# Patient Record
Sex: FEMALE | Race: WHITE | Employment: UNEMPLOYED | URBAN - NONMETROPOLITAN AREA
[De-identification: names, ages, dates, MRNs, and addresses within clinical notes are randomized per-mention and may not be internally consistent; named-entity substitution may affect disease eponyms.]

---

## 2017-10-10 ENCOUNTER — HOSPITAL ENCOUNTER (OUTPATIENT)
Dept: PHYSICAL THERAPY | Age: 52
Setting detail: THERAPIES SERIES
Discharge: HOME | End: 2017-10-10
Payer: OTHER GOVERNMENT

## 2017-10-10 PROCEDURE — 97035 APP MDLTY 1+ULTRASOUND EA 15: CPT

## 2017-10-10 PROCEDURE — G8979 MOBILITY GOAL STATUS: HCPCS

## 2017-10-10 PROCEDURE — 97110 THERAPEUTIC EXERCISES: CPT

## 2017-10-10 PROCEDURE — 97162 PT EVAL MOD COMPLEX 30 MIN: CPT

## 2017-10-10 PROCEDURE — G8978 MOBILITY CURRENT STATUS: HCPCS

## 2017-10-10 NOTE — PROGRESS NOTES
Phone: [address]          Fax: [phone]                      Outpatient Physical Therapy                                                                      Evaluation  Date: 10/10/2017  Patient: Bárbara Solis  : 1965  CSN #: [id number]  Referring Practitioner: Ari Boyle DPM    Referral Date : 10/05/17     Diagnosis: Neuritis dorsal aspect left foot    Treatment Diagnosis: left foot pain, low back pain  Onset Date: 10/05/17  PT Insurance Information:   Total # of Visits Approved: 18   Total # of Visits to Date: 1  No Show: 0  Canceled Appointment: 0     Subjective  Subjective: Pt states she fell in Aug of last year when she missed a step and landed on lat side of foot. Pain gradually went away. In the middle of this year, pt started with a burning around the base of her first and second toes. This time pain has not been associated with any type of injury. Foot was again x-rayed and pt states Dr had mentioned something about a small bone fragment but not sure where. Pt had an injection last week, foot was very sore following. Still not sure if the shot has helped or not. Pt states she cannot wear tennis shoes because of pain. Pain is described more as a burning. Can be home all day without shoes and have no problems, once shoes are on, burning starts within one hour. Additional Pertinent Hx: Panic attacks, anxiety, depression             Objective     Observation/Palpation  Palpation: Mild tenderness web space between 1st and 2nd ray  Observation: pes planus right greater than left. Spine  Lumbar: ROM is WFL without ERP  Special Tests: Negative Slump but some neuro tension present        Additional Measures  Special Tests: No increase sx's with compression of metatarsal heads.  Good mobility of left foot and talocrural joint  Other: Decrease sensation with light touch left lat calf and great toe              Assessment  Assessment: Pt is a 52 year old female with complaints of left foot pain. Pt presents with AROM of left foot and ankle WFL. Mild tenderness noted between 1st and 2nd ray. No complaints of increase sx's with compression to metatarsal heads. Pt does note decrease sensation to left lateral lower leg and left great toe, question possible lumbar involvement. Will initiate US with Lidex once medication received and will continue to assess lumbar spine for possible involvement. Prognosis: Good        Decision Making: Medium Complexity    Patient Education  PT eval, POC, HEP  Pt verbalized/demonstrated good understanding:     [x] Yes         [] No, pt required further clarification. [x] Primary Impairment :   G Code:    [x] Mobility         [] Carry        [] Body Position       [] Self Care      [] Other:   Functional Impairment Current:  [] 0%    [] 1-19% [x] 20-39% [] 40-59% [] 60-79%   [] 80-99% [] 100%  Functional Impairment Goal:  [x] 0%    [] 1-19% [] 20-39% [] 40-59% [] 60-79%   [] 80-99% [] 100%  G Code Functional Impairment determined by:  [x] Clinical Judgment   [] Outcome Measure:     Goals  Short term goals  Time Frame for Short term goals: 3 weeks  Short term goal 1: Pt to be instructed in home program.   Short term goal 2: Pt to have lumbar spine further assessed for possible contribution of pain. Short term goal 3: Pt to receive phonophoresis with use of Lidex cream per PT script once Lidex is obtained by pt. Long term goals  Time Frame for Long term goals : 6 weeks  Long term goal 1: Pt to report independence and compliance with home program.   Long term goal 2: Pt to report decrease burning and numbness in left foot by 75% throughout the day. Long term goal 3: Pt to ambulate 2hours with shoes donned and no increase sx's in left foot. Patient goals : \"Get rid of the pain. \"        Minutes Tracking:  Time In: 1100  Time Out: 7167  Minutes: [address] Jaleel, PT, DPT       10/10/2017

## 2017-10-10 NOTE — PLAN OF CARE
[facility]           Phone: [phone]             Outpatient Physical Therapy  Fax: [phone]                                           Date: 10/10/2017  Patient: Ousmane Vega : 1965 [facility] #: [id number]   Referring Practitioner:  Karo Rdz DPM Referral Date:  10/05/17       [x] Plan of Care   [] Updated Plan of Care    Dates of Service to Include: 10/10/2017 to 17    Diagnosis:  Neuritis dorsal aspect left foot    Rehab (Treatment) Diagnosis:  left foot pain, low back pain             Onset Date:  10/05/17    Attendance  Total # of Visits to Date: 1 No Show: 0 Canceled Appointment: 0    Assessment  Assessment: Pt is a 47year old female with complaints of left foot pain. Pt presents with AROM of left foot and ankle WFL. Mild tenderness noted between 1st and 2nd ray. No complaints of increase sx's with compression to metatarsal heads. Pt does note decrease sensation to left lateral lower leg and left great toe, question possible lumbar involvement. Will initiate US with Lidex once medication received and will continue to assess lumbar spine for possible involvement. [x] Primary Impairment :  G Code:    [x] Mobility         [] Carry        [] Body Position       [] Self Care      [] Other:   Functional Impairment Current:  [] 0%    [] 1-19% [x] 20-39% [] 40-59% [] 60-79%    [] 80-99% [] 100%  Functional Impairment Goal:  [x] 0%    [] 1-19% [] 20-39% [] 40-59% [] 60-79%    [] 80-99% [] 100%  G Code Functional Impairment determined by:  [x] Clinical Judgment   [] Outcome Measure:     Goals  Short term goals  Time Frame for Short term goals: 3 weeks  Short term goal 1: Pt to be instructed in home program.   Short term goal 2: Pt to have lumbar spine further assessed for possible contribution of pain.    Short term goal 3: Pt to receive phonophoresis with use of Lidex cream per PT script once Lidex is

## 2017-10-12 ENCOUNTER — HOSPITAL ENCOUNTER (OUTPATIENT)
Dept: PHYSICAL THERAPY | Age: 52
Setting detail: THERAPIES SERIES
Discharge: HOME | End: 2017-10-12
Payer: OTHER GOVERNMENT

## 2017-10-12 ENCOUNTER — HOSPITAL ENCOUNTER (OUTPATIENT)
Dept: WOMENS IMAGING | Age: 52
Discharge: HOME | End: 2017-10-12
Payer: OTHER GOVERNMENT

## 2017-10-12 DIAGNOSIS — Z12.31 ENCOUNTER FOR SCREENING MAMMOGRAM FOR BREAST CANCER: ICD-10-CM

## 2017-10-12 PROCEDURE — G0202 SCR MAMMO BI INCL CAD: HCPCS

## 2017-10-12 PROCEDURE — 97035 APP MDLTY 1+ULTRASOUND EA 15: CPT

## 2017-10-12 PROCEDURE — 97110 THERAPEUTIC EXERCISES: CPT

## 2017-10-12 NOTE — PROGRESS NOTES
[]Partially met  []Not met   Short term goal 3: Pt to receive phonophoresis with use of Lidex cream per PT script once Lidex is obtained by pt.-met   [x]Met   []Partially met  []Not met      []Met   []Partially met  []Not met     Long Term Goals - Time Frame for Long term goals : 6 weeks  Long term goal 1: Pt to report independence and compliance with home program.  []Met  []Partially met  [x]Not met   Long term goal 2: Pt to report decrease burning and numbness in left foot by 75% throughout the day. []Met  []Partially met  [x]Not met   Long term goal 3: Pt to ambulate 2hours with shoes donned and no increase sx's in left foot.   []Met  []Partially met  [x]Not met     []Met  []Partially met  []Not met     []Met  []Partially met  []Not met       Minutes Tracking:  Time In: [address] [facility]  Time Out: 1800  Minutes: 35    Aldo Mitchell PT, DPT Date: 10/12/2017

## 2017-10-17 ENCOUNTER — HOSPITAL ENCOUNTER (OUTPATIENT)
Dept: PHYSICAL THERAPY | Age: 52
Setting detail: THERAPIES SERIES
Discharge: HOME | End: 2017-10-17
Payer: OTHER GOVERNMENT

## 2017-10-17 PROCEDURE — 97110 THERAPEUTIC EXERCISES: CPT

## 2017-10-17 PROCEDURE — 97035 APP MDLTY 1+ULTRASOUND EA 15: CPT

## 2017-10-19 ENCOUNTER — HOSPITAL ENCOUNTER (OUTPATIENT)
Dept: PHYSICAL THERAPY | Age: 52
Setting detail: THERAPIES SERIES
Discharge: HOME | End: 2017-10-19
Payer: OTHER GOVERNMENT

## 2017-10-19 PROCEDURE — 97110 THERAPEUTIC EXERCISES: CPT

## 2017-10-20 NOTE — PROGRESS NOTES
term goal 3: Pt to receive phonophoresis with use of Lidex cream per PT script once Lidex is obtained by pt.-met   [x]Met   []Partially met  []Not met      []Met   []Partially met  []Not met     Long Term Goals - Time Frame for Long term goals : 6 weeks  Long term goal 1: Pt to report independence and compliance with home program.  []Met  []Partially met  []Not met   Long term goal 2: Pt to report decrease burning and numbness in left foot by 75% throughout the day. []Met  []Partially met  []Not met   Long term goal 3: Pt to ambulate 2hours with shoes donned and no increase sx's in left foot.   []Met  []Partially met  []Not met     []Met  []Partially met  []Not met     []Met  []Partially met  []Not met       Minutes Tracking:  Time In: 5459  Time Out: [address]  Minutes: 35    Vadim Hanks DPT       Date: 10/19/2017

## 2017-10-24 ENCOUNTER — HOSPITAL ENCOUNTER (OUTPATIENT)
Dept: PHYSICAL THERAPY | Age: 52
Setting detail: THERAPIES SERIES
Discharge: HOME | End: 2017-10-24
Payer: OTHER GOVERNMENT

## 2017-10-24 PROCEDURE — 97140 MANUAL THERAPY 1/> REGIONS: CPT

## 2017-10-24 PROCEDURE — 97035 APP MDLTY 1+ULTRASOUND EA 15: CPT

## 2017-10-26 ENCOUNTER — HOSPITAL ENCOUNTER (OUTPATIENT)
Dept: PHYSICAL THERAPY | Age: 52
Setting detail: THERAPIES SERIES
Discharge: HOME | End: 2017-10-26
Payer: OTHER GOVERNMENT

## 2017-10-26 PROCEDURE — 97035 APP MDLTY 1+ULTRASOUND EA 15: CPT

## 2017-10-26 PROCEDURE — 97110 THERAPEUTIC EXERCISES: CPT

## 2017-10-31 ENCOUNTER — HOSPITAL ENCOUNTER (OUTPATIENT)
Dept: PHYSICAL THERAPY | Age: 52
Setting detail: THERAPIES SERIES
Discharge: HOME | End: 2017-10-31
Payer: OTHER GOVERNMENT

## 2017-10-31 PROCEDURE — 97035 APP MDLTY 1+ULTRASOUND EA 15: CPT

## 2017-10-31 PROCEDURE — G8978 MOBILITY CURRENT STATUS: HCPCS

## 2017-10-31 PROCEDURE — 97140 MANUAL THERAPY 1/> REGIONS: CPT

## 2017-10-31 PROCEDURE — G8979 MOBILITY GOAL STATUS: HCPCS

## 2017-11-01 NOTE — PROGRESS NOTES
Phone: [facility]           Fax: [phone]                           Outpatient Physical Therapy                                                                            Daily Note    Patient: Jennifer Arrieta : 1965  CSN #: [id number]   Referring Practitioner:  Yohana Esteban DPM    Referral Date : 10/05/17     Date: 2017    Diagnosis: Neuritis dorsal aspect left foot  Treatment Diagnosis: left foot pain, low back pain    Onset Date: 10/05/17  PT Insurance Information:   Total # of Visits Approved: 18 Per Physician Order  Total # of Visits to Date: 7  No Show: 0  Canceled Appointment: 0      Pre-Treatment Pain:  0/10  Subjective: Still about the same; tried pads in shoes with no change in time standing before burning was noted. Exercises:   Exercise 7: standing arching of con feet with focus also on hip ER              Manual:  Joint mobilization: medial foot (1st met on cuneiform, 2nd met on cuneiform, cuneiforms on navicular) - grade II and III for mobility           Modalities:  Ultrasound: US x 8 mins 1.0 at 50% with lidex b/t 1st and 2nd ray                Assessment  Assessment: Pt with min tenderness between 2nd metarsal and 2nd cuneiform. Continued with phonophoresis to this region. Will continue PT an additional 2-3 visits to monitor changes with addition of joint mobs and consistent US to this region. Patient Education  Purpose of joint mobs and continued US 2-3 visits. Pt verbalized/demonstrated good understanding:     [x] Yes         [] No, pt required further clarification.     Post Treatment Pain:  0/10      Plan  Times per week: 2  Plan weeks: 6      Goals  (Total # of Visits to Date: 7)   Short Term Goals - Time Frame for Short term goals: 3 weeks     Short term goal 1: Pt to be instructed in home program.-met                                         []Met   []Partially met  []Not met   Short term goal 2: Pt to have lumbar spine

## 2017-11-02 ENCOUNTER — HOSPITAL ENCOUNTER (OUTPATIENT)
Dept: PHYSICAL THERAPY | Age: 52
Setting detail: THERAPIES SERIES
Discharge: HOME | End: 2017-11-02
Payer: OTHER GOVERNMENT

## 2017-11-02 PROCEDURE — 97035 APP MDLTY 1+ULTRASOUND EA 15: CPT

## 2017-11-02 PROCEDURE — 97140 MANUAL THERAPY 1/> REGIONS: CPT

## 2017-11-02 NOTE — PROGRESS NOTES
Phone: [facility]           Fax: [phone]                           Outpatient Physical Therapy                                                                            Daily Note    Patient: Isaiah Ornelas : 1965  CSN #: [id number]   Referring Practitioner:  Luis Gan DPM    Referral Date : 10/05/17     Date: 2017    Diagnosis: Neuritis dorsal aspect left foot  Treatment Diagnosis: left foot pain, low back pain    Onset Date: 10/05/17  PT Insurance Information:   Total # of Visits Approved: 18 Per Physician Order  Total # of Visits to Date: 8  No Show: 0  Canceled Appointment: 0      Pre-Treatment Pain:  0/10  Subjective: Pt states she saw doctor on Tuesday after therapy and is in the process of getting an order for an MRI, as pt states she has had no improvement thus far with PT. Exercises:        Exercise 6: towel scrunch x 4-5 mins  Exercise 7: standing arching of con feet with focus also on hip ER          Manual:  Joint mobilization: medial foot (1st met on cuneiform, 2nd met on cuneiform, cuneiforms on navicular) - grade II and III for mobility          Modalities:    Ultrasound: US x 8 mins 1.0 at 50% with lidex b/t 1st and 2nd ray           Assessment  Assessment: Audible pop with joints mobs to 1st and 2nd ray; however, pt denies change in symptoms with joint mobs. Pt requesting to leave early due to scheduling conflicts this date; therefore, limited amount of ther ex performed and instructed pt to perform HEP. Patient Education  Instructed pt to cont with HEP, wayne due to time restrictions with therapy this date. Pt verbalized/demonstrated good understanding:     [x] Yes         [] No, pt required further clarification.     Post Treatment Pain:  0/10      Plan  Times per week: 2  Plan weeks: 6      Goals  (Total # of Visits to Date: 8)   Short Term Goals - Time Frame for Short term goals: 3 weeks     Short term goal 1: Pt to be instructed in home program.-met                                         []Met   []Partially met  []Not met   Short term goal 2: Pt to have lumbar spine further assessed for possible contribution of pain.-met   []Met   []Partially met  []Not met   Short term goal 3: Pt to receive phonophoresis with use of Lidex cream per PT script once Lidex is obtained by pt.-met   []Met   []Partially met  []Not met      []Met   []Partially met  []Not met     Long Term Goals - Time Frame for Long term goals : 6 weeks  Long term goal 1: Pt to report independence and compliance with home program.  []Met  []Partially met  []Not met   Long term goal 2: Pt to report decrease burning and numbness in left foot by 75% throughout the day. []Met  []Partially met  []Not met   Long term goal 3: Pt to ambulate 2hours with shoes donned and no increase sx's in left foot.   []Met  []Partially met  []Not met     []Met  []Partially met  []Not met     []Met  []Partially met  []Not met       Minutes Tracking:  Time In: 4489  Time Out: 233 [facility]  Minutes: Rhiannon, [address] [facility], DPT  Date: 11/2/2017

## 2017-11-07 ENCOUNTER — HOSPITAL ENCOUNTER (OUTPATIENT)
Dept: PHYSICAL THERAPY | Age: 52
Setting detail: THERAPIES SERIES
Discharge: HOME | End: 2017-11-07
Payer: OTHER GOVERNMENT

## 2017-11-07 PROCEDURE — 97140 MANUAL THERAPY 1/> REGIONS: CPT

## 2017-11-07 PROCEDURE — 97035 APP MDLTY 1+ULTRASOUND EA 15: CPT

## 2017-11-08 PROCEDURE — G8980 MOBILITY D/C STATUS: HCPCS

## 2017-11-08 PROCEDURE — G8979 MOBILITY GOAL STATUS: HCPCS

## 2017-11-08 NOTE — PLAN OF CARE
Phone: [facility]          Fax: [phone]                            Outpatient Physical Therapy                                                                    Discharge Summary    Patient: Alma Arevalo  : 1965  CSN #: [id number]   Referring physician: No admitting provider for patient encounter. Referring Practitioner: Chucho Barragan DPM      Diagnosis: Neuritis dorsal aspect left foot      Date Treatment Initiated: 10/10/17  Date of Last Treatment: 17      PT Visit Information  Onset Date: 10/05/17  PT Insurance Information:   Total # of Visits Approved: 9  Total # of Visits to Date: 9  Plan of Care/Certification Expiration Date: 17  No Show: 0  Canceled Appointment: 0      Frequency/Duration   3 times per week   4 weeks      Treatment Received  []HP/CP      []Electrical Stim   [x]Therapeutic Exercise      []Gait Training  []Aquatics   [x]Ultrasound         [x]Patient Education/HEP   [x]Manual Therapy  []Traction    []Neuro-ginna        [x]Soft Tissue Mobs            []Home TENS  []Iontophoresis    []Orthotic casting/fitting      []Dry Needling    Assessment  Assessment: Pt has completed 9 PT visits for left foot pain/burning. Pt with limited visits per insurance. Reports increase tingling with tapping near 1st metatarsal and cuneiform joint. Unable to reproduce pain through superior tib/fib joint or through compression of metatarsal heads. We will now discharge to home program due to insurance limitations.         Goals  Short term goals  Time Frame for Short term goals: 3 weeks  Short term goal 1: Pt to be instructed in home program.-met   Short term goal 2: Pt to have lumbar spine further assessed for possible contribution of pain.-met   Short term goal 3: Pt to receive phonophoresis with use of Lidex cream per PT script once Lidex is obtained by pt.-met     Long term goals  Time Frame for Long term goals : 6 weeks  Long term goal 1: Pt

## 2017-11-08 NOTE — PROGRESS NOTES
Phone: [phone]                 [facility]           Fax: [phone]                           Outpatient Physical Therapy                                                                            Daily Note    Patient: Jennifer Arrieta : 1965  CSN #: [id number]   Referring Practitioner:  Yohana Esteban DPM    Referral Date : 10/05/17     Date: 2017    Diagnosis: Neuritis dorsal aspect left foot  Treatment Diagnosis: left foot pain, low back pain    Onset Date: 10/05/17  PT Insurance Information:   Total # of Visits Approved: 9 Per Physician Order  Total # of Visits to Date: 9  No Show: 0  Canceled Appointment: 0      Pre-Treatment Pain:  0/10  Subjective: Pt states this is last approved visit per insurance. Will have MRI on Tuesday of next week. Wore a pair of shoes today that consistently causes pain in left foot; however, states she is not noticing sx's today with these shoes. Exercises:  Exercise 3: foot roller 2'   Exercise 6: towel scrunch x 4-5 mins  Exercise 7: standing arching of con feet with focus also on hip ER             Manual:  Joint mobilization: medial foot (1st met on cuneiform, 2nd met on cuneiform, cuneiforms on navicular) - grade II and III for mobility           Modalities:  Ultrasound: US x 8 mins 1.0 at 50% with lidex b/t 1st and 2nd ray                Assessment  Assessment: Pt has completed 9 PT visits for left foot pain/burning. Pt with limited visits per insurance. Reports increase tingling with tapping near 1st metatarsal and cuneiform joint. Unable to reproduce pain through superior tib/fib joint or through compression of metatarsal heads. We will now discharge to home program due to insurance limitations. Patient Education  Continued HEP  Pt verbalized/demonstrated good understanding:     [x] Yes         [] No, pt required further clarification.     Post Treatment Pain:  0/10      Plan  Times per week: 2  Plan weeks: 6      Goals  (Total # of Visits to Date: 5)   Short Term Goals - Time Frame for Short term goals: 3 weeks     Short term goal 1: Pt to be instructed in home program.-met                                         []Met   []Partially met  []Not met   Short term goal 2: Pt to have lumbar spine further assessed for possible contribution of pain.-met   []Met   []Partially met  []Not met   Short term goal 3: Pt to receive phonophoresis with use of Lidex cream per PT script once Lidex is obtained by pt.-met   []Met   []Partially met  []Not met      []Met  []Partially met  []Not met     Long Term Goals - Time Frame for Long term goals : 6 weeks  Long term goal 1: Pt to report independence and compliance with home program.  []Met  []Partially met  []Not met   Long term goal 2: Pt to report decrease burning and numbness in left foot by 75% throughout the day. []Met  []Partially met  []Not met   Long term goal 3: Pt to ambulate 2hours with shoes donned and no increase sx's in left foot.   []Met  []Partially met  []Not met     []Met  []Partially met  []Not met     []Met  []Partially met  []Not met       Minutes Tracking:  Time In: 1130  Time Out: [address]  Minutes: Danna 99, PT, DPT Date: 11/7/2017

## 2017-12-07 ENCOUNTER — HOSPITAL ENCOUNTER (OUTPATIENT)
Dept: GENERAL RADIOLOGY | Age: 52
Discharge: HOME | End: 2017-12-07
Payer: OTHER GOVERNMENT

## 2017-12-07 ENCOUNTER — HOSPITAL ENCOUNTER (OUTPATIENT)
Dept: NON INVASIVE DIAGNOSTICS | Age: 52
Discharge: HOME | End: 2017-12-07
Payer: OTHER GOVERNMENT

## 2017-12-07 DIAGNOSIS — Z98.890 STATUS POST LEFT FOOT SURGERY: ICD-10-CM

## 2017-12-07 LAB
EKG ATRIAL RATE: 78 BPM
EKG P AXIS: 44 DEGREES
EKG P-R INTERVAL: 188 MS
EKG Q-T INTERVAL: 376 MS
EKG QRS DURATION: 76 MS
EKG QTC CALCULATION (BAZETT): 428 MS
EKG R AXIS: 61 DEGREES
EKG T AXIS: 75 DEGREES
EKG VENTRICULAR RATE: 78 BPM

## 2017-12-07 PROCEDURE — 93005 ELECTROCARDIOGRAM TRACING: CPT

## 2017-12-07 PROCEDURE — 71020 XR CHEST STANDARD TWO VW: CPT

## 2017-12-12 ENCOUNTER — HOSPITAL ENCOUNTER (OUTPATIENT)
Dept: NON INVASIVE DIAGNOSTICS | Age: 52
Discharge: HOME | End: 2017-12-12
Payer: OTHER GOVERNMENT

## 2017-12-12 PROCEDURE — 93017 CV STRESS TEST TRACING ONLY: CPT

## 2017-12-13 NOTE — PROCEDURES
[address] [address]                                CARDIAC STRESS TEST    PATIENT NAME: Shirley Yarbrough                    :        1965  MED REC NO:   [id number]                              ROOM:  ACCOUNT NO:   [de-identified]                           ADMIT DATE: 2017  PROVIDER:     Angus Stafford    CARDIOVASCULAR DIAGNOSTIC DEPARTMENT    DATE OF STUDY:  2017    ORDERING PROVIDER:  Tirso Strange NP    PRIMARY CARE PROVIDER:  Tirso Strange NP    INTERPRETING PHYSICIAN:  Angus Stafford MD    EXERCISE STRESS TEST REPORT    Stress, exercise stress    INDICATIONS:  Assessment of a cardiac cause of:  Abnormal ECG    CLINICAL HISTORY:  The patient is a 71-year-old woman with no known  coronary artery disease. Previous cardiac history includes:  None    Other previous history includes:  Lightheadedness, heartburn    Symptoms just prior to testing include:  None    Relevant medications:  None    PROCEDURE:  The patient performed treadmill exercise using a Elbert  protocol, completing 8:24 minutes and completing an estimated workload of  45.33 metabolic equivalents (METS). The test was terminated due to fatigue, shortness of breath, EKG changes. The heart rate was 97 beats per minute at baseline and increased to 179  beats at peak exercise, which was 105% of the maximum predicted heart rate. The rest blood pressure was 122/70 mm/Hg and increased to 180/60 mm/Hg,  which is a normal response. During the procedure, the patient developed  fatigue, shortness of breath and leg fatigue but denied chest discomfort. STRESS ECG RESULTS:  The resting electrocardiogram demonstrated normal  sinus rhythm without definitive ST-segment abnormalities suggestive of  myocardial ischemia.     At peak exercise and during recovery, the patient developed:    Horizontal segment changes in leads II, III, aVF, V5, V6 which did

## 2017-12-21 ENCOUNTER — HOSPITAL ENCOUNTER (OUTPATIENT)
Dept: NON INVASIVE DIAGNOSTICS | Age: 52
Discharge: HOME | End: 2017-12-21
Payer: OTHER GOVERNMENT

## 2017-12-21 PROCEDURE — 3430000000 HC RX DIAGNOSTIC RADIOPHARMACEUTICAL: Performed by: NURSE PRACTITIONER

## 2017-12-21 PROCEDURE — A9500 TC99M SESTAMIBI: HCPCS | Performed by: NURSE PRACTITIONER

## 2017-12-21 PROCEDURE — 78452 HT MUSCLE IMAGE SPECT MULT: CPT

## 2017-12-21 PROCEDURE — 93017 CV STRESS TEST TRACING ONLY: CPT

## 2017-12-21 RX ADMIN — Medication 30 MILLICURIE: at 07:50

## 2017-12-22 ENCOUNTER — HOSPITAL ENCOUNTER (OUTPATIENT)
Dept: NON INVASIVE DIAGNOSTICS | Age: 52
Discharge: HOME | End: 2017-12-22
Payer: OTHER GOVERNMENT

## 2017-12-22 PROCEDURE — A9500 TC99M SESTAMIBI: HCPCS | Performed by: NURSE PRACTITIONER

## 2017-12-22 PROCEDURE — 3430000000 HC RX DIAGNOSTIC RADIOPHARMACEUTICAL: Performed by: NURSE PRACTITIONER

## 2017-12-22 RX ADMIN — Medication 31.7 MILLICURIE: at 12:00

## 2017-12-26 NOTE — PROCEDURES
monitoring without significant associated arrhythmias. The patient's Duke Treadmill score is 0, which correlates with an  intermediate risk for coronary artery disease. Overall, these results are most consistent with a low/intermediate risk  scan. Depending on the patient symptoms and level of clinical suspicion,  aggressive medical management vs. additional testing by coronary  angiography may be indicated. NATIVIDAD LAWRENCE  D: 12/26/2017 11:23:28       T: 12/26/2017 11:24:36     [email or web address]  Job#: [id number]    Doc#: Unknown  CC:  Ronna Acosta

## 2018-01-02 ENCOUNTER — OFFICE VISIT (OUTPATIENT)
Dept: CARDIOLOGY | Age: 53
End: 2018-01-02
Payer: OTHER GOVERNMENT

## 2018-01-02 VITALS
HEIGHT: 68 IN | BODY MASS INDEX: 35.77 KG/M2 | OXYGEN SATURATION: 98 % | SYSTOLIC BLOOD PRESSURE: 123 MMHG | WEIGHT: 236 LBS | DIASTOLIC BLOOD PRESSURE: 77 MMHG | RESPIRATION RATE: 16 BRPM | HEART RATE: 84 BPM

## 2018-01-02 DIAGNOSIS — R94.31 ABNORMAL ECG: ICD-10-CM

## 2018-01-02 DIAGNOSIS — I25.118 CORONARY ARTERY DISEASE OF NATIVE ARTERY OF NATIVE HEART WITH STABLE ANGINA PECTORIS (HCC): ICD-10-CM

## 2018-01-02 DIAGNOSIS — R94.39 ABNORMAL STRESS TEST: Primary | ICD-10-CM

## 2018-01-02 DIAGNOSIS — R07.89 ATYPICAL CHEST PAIN: ICD-10-CM

## 2018-01-02 PROCEDURE — 99244 OFF/OP CNSLTJ NEW/EST MOD 40: CPT | Performed by: FAMILY MEDICINE

## 2018-01-02 RX ORDER — ATORVASTATIN CALCIUM 20 MG/1
20 TABLET, FILM COATED ORAL DAILY
Qty: 90 TABLET | Refills: 3 | Status: SHIPPED | OUTPATIENT
Start: 2018-01-02 | End: 2018-01-02 | Stop reason: SDUPTHER

## 2018-01-02 RX ORDER — MONTELUKAST SODIUM 10 MG/1
10 TABLET ORAL PRN
COMMUNITY

## 2018-01-02 RX ORDER — TRAZODONE HYDROCHLORIDE 50 MG/1
50 TABLET ORAL PRN
COMMUNITY

## 2018-01-02 RX ORDER — OMEPRAZOLE 20 MG/1
20 CAPSULE, DELAYED RELEASE ORAL DAILY
COMMUNITY

## 2018-01-02 RX ORDER — ATORVASTATIN CALCIUM 20 MG/1
20 TABLET, FILM COATED ORAL DAILY
Qty: 90 TABLET | Refills: 3 | Status: SHIPPED | OUTPATIENT
Start: 2018-01-02 | End: 2018-09-04 | Stop reason: SDUPTHER

## 2018-01-02 NOTE — PROGRESS NOTES
Virginia Doe CMA am scribing for and in the presence of Dr. Triston Topete    Patient: Radha Martinez  : 1965  Date of Visit: 2018    REASON FOR VISIT / CONSULTATION: New Patient (Abnormal stress test done on 17 and EKG done on 17. Pt was supposed to have surgery done on her left foot but an abnormal EKG had come back and then stress test had come back abnormal. Pt has had chest discomfort(twinge)  on left side ( over the past year four or five times and duration of about an hour) but with no radiation. C/O: quite often she does have lightheadedness and has had this her whole life, she states she thinks it is getting a little worse. Denies Palpitations and SOB. )      Dear Kay Youssef had the pleasure of seeing Radha Martinez in my office today. Ms. Karthik Severino is a 47 y.o. female who recently underwent a cardiovascular stress test because of an abnormal ECG as part of a pre-operative clearance exam.    Symptoms: Ms. Karthik Severino reports having chest discomfort that she describes as intermittent, discomfort is \"twinge\" like in nature, does not radiate. She says this has happened four times within the last year that she thinks might last for an hour but she cannot be sure. The last episode was a couple of months ago. Associated symptoms include: none. Aggravating factors include none, and alleviating factors are none. She also reports having lightheadedness often and has had this for a very long time. Exercise Tolerance: She reports having a an excellent exercise tolerance. Ms. Karthik Severino says that she can walk >1 mile without developing chest discomfort or significant shortness of breath. She denied any current or recent chest pain, shortness of breath, abdominal pain, bleeding problems, problems with her medications or any other concerns at this time.       Past Medical History:   Diagnosis Date    Depression     GERD (gastroesophageal reflux disease)     H/O cardiovascular mouth as needed      Albuterol Sulfate (PROAIR HFA IN) Inhale into the lungs as needed      FLUoxetine (PROZAC) 20 MG capsule Take 60 mg by mouth       buPROPion (WELLBUTRIN XL) 300 MG XL tablet Take 450 mg by mouth every morning.  estradiol (ESTRACE) 2 MG tablet Take 2 mg by mouth daily.  ALPRAZolam (XANAX) 0.5 MG tablet Take 0.25 mg by mouth as needed .  multivitamin (THERAGRAN) per tablet Take 1 tablet by mouth daily. FAMILY HISTORY: family history includes Stroke in her father and mother. PHYSICAL EXAM:   /77 (Site: Right Arm, Position: Sitting, Cuff Size: Large Adult)   Pulse 84   Resp 16   Ht 5' 8\" (1.727 m)   Wt 236 lb (107 kg)   SpO2 98%   BMI 35.88 kg/m²  Body mass index is 35.88 kg/m². Constitutional: She is oriented to person, place, and time. She appears well-developed and well-nourished. In no acute distress. HEENT: Normocephalic and atraumatic. No JVD present. Carotid bruit is not present. No mass and no thyromegaly present. No lymphadenopathy present. Cardiovascular: Normal rate, regular rhythm, normal heart sounds. Exam reveals no gallop and no friction rubs. No heart murmur heard. Pulmonary/Chest: Effort normal and breath sounds normal. No respiratory distress. She has no wheezes, rhonchi or rales. Abdominal: Soft, non-tender. Bowel sounds and aorta are normal. She exhibits no organomegaly, mass or bruit. Extremities: No edema. No cyanosis and no clubbing. Pulses are 2+ radial and carotid pulses. 2+ dorsalis pedis and posterior tibial pulses bilaterally. Neurological: She is alert and oriented to person, place, and time. No evidence of gross cranial nerve deficit. Coordination appeared normal.   Skin: Skin is warm and dry. There is no rash or diaphoresis. Psychiatric: She has a normal mood and affect.  Her speech is normal and behavior is normal.          MOST RECENT LABS ON RECORD:   Lab Results   Component Value Date    WBC 6.4 12/03/2014

## 2018-01-09 ENCOUNTER — HOSPITAL ENCOUNTER (OUTPATIENT)
Dept: NON INVASIVE DIAGNOSTICS | Age: 53
Discharge: HOME | End: 2018-01-09
Payer: OTHER GOVERNMENT

## 2018-01-09 DIAGNOSIS — R94.31 ABNORMAL ECG: ICD-10-CM

## 2018-01-09 DIAGNOSIS — R07.89 ATYPICAL CHEST PAIN: ICD-10-CM

## 2018-01-09 DIAGNOSIS — R94.39 ABNORMAL STRESS TEST: ICD-10-CM

## 2018-01-09 LAB
LV EF: 60 %
LVEF MODALITY: NORMAL

## 2018-01-09 PROCEDURE — 93306 TTE W/DOPPLER COMPLETE: CPT

## 2018-01-10 ENCOUNTER — TELEPHONE (OUTPATIENT)
Dept: CARDIOLOGY | Age: 53
End: 2018-01-10

## 2018-01-10 NOTE — TELEPHONE ENCOUNTER
Patient returned call to office regarding her Echo results. Results were given.   Per Dr. Givens Confer   Echo results OK

## 2018-02-05 ENCOUNTER — TELEPHONE (OUTPATIENT)
Dept: CARDIOLOGY | Age: 53
End: 2018-02-05

## 2018-02-05 NOTE — TELEPHONE ENCOUNTER
Eboni Cancer called in and decided that she would like to proceed with a heart cath at the end of March. 9(3/30/18 @ 9 am) She will need orders put in please.

## 2018-02-15 ENCOUNTER — HOSPITAL ENCOUNTER (OUTPATIENT)
Dept: LAB | Age: 53
Discharge: HOME | End: 2018-02-15
Payer: OTHER GOVERNMENT

## 2018-02-15 DIAGNOSIS — R94.39 ABNORMAL CARDIOVASCULAR STRESS TEST: Primary | ICD-10-CM

## 2018-02-15 PROCEDURE — G0480 DRUG TEST DEF 1-7 CLASSES: HCPCS

## 2018-02-15 PROCEDURE — 36415 COLL VENOUS BLD VENIPUNCTURE: CPT

## 2018-02-23 LAB
Lab: 160 NG/ML
Lab: 490 NG/ML

## 2018-03-23 DIAGNOSIS — Z01.818 PRE-OP TESTING: Primary | ICD-10-CM

## 2018-03-26 ENCOUNTER — TELEPHONE (OUTPATIENT)
Dept: CARDIOLOGY | Age: 53
End: 2018-03-26

## 2018-03-26 ENCOUNTER — HOSPITAL ENCOUNTER (OUTPATIENT)
Age: 53
Discharge: HOME | End: 2018-03-26
Payer: OTHER GOVERNMENT

## 2018-03-26 DIAGNOSIS — Z01.818 PRE-OP TESTING: ICD-10-CM

## 2018-03-26 LAB
ANION GAP SERPL CALCULATED.3IONS-SCNC: 10 MMOL/L (ref 9–17)
BUN BLDV-MCNC: 12 MG/DL (ref 6–20)
BUN/CREAT BLD: 14 (ref 9–20)
CALCIUM SERPL-MCNC: 9.6 MG/DL (ref 8.6–10.4)
CHLORIDE BLD-SCNC: 103 MMOL/L (ref 98–107)
CO2: 30 MMOL/L (ref 20–31)
CREAT SERPL-MCNC: 0.86 MG/DL (ref 0.5–0.9)
GFR AFRICAN AMERICAN: >60 ML/MIN
GFR NON-AFRICAN AMERICAN: >60 ML/MIN
GFR SERPL CREATININE-BSD FRML MDRD: NORMAL ML/MIN/{1.73_M2}
GFR SERPL CREATININE-BSD FRML MDRD: NORMAL ML/MIN/{1.73_M2}
GLUCOSE BLD-MCNC: 99 MG/DL (ref 70–99)
HCT VFR BLD CALC: 40.5 % (ref 36.3–47.1)
HEMOGLOBIN: 12.9 G/DL (ref 11.9–15.1)
MCH RBC QN AUTO: 27.9 PG (ref 25.2–33.5)
MCHC RBC AUTO-ENTMCNC: 31.9 G/DL (ref 28.4–34.8)
MCV RBC AUTO: 87.7 FL (ref 82.6–102.9)
NRBC AUTOMATED: 0 PER 100 WBC
PDW BLD-RTO: 11.9 % (ref 11.8–14.4)
PLATELET # BLD: 282 K/UL (ref 138–453)
PMV BLD AUTO: 9.3 FL (ref 8.1–13.5)
POTASSIUM SERPL-SCNC: 4.7 MMOL/L (ref 3.7–5.3)
RBC # BLD: 4.62 M/UL (ref 3.95–5.11)
SODIUM BLD-SCNC: 143 MMOL/L (ref 135–144)
WBC # BLD: 5.9 K/UL (ref 3.5–11.3)

## 2018-03-26 PROCEDURE — 80048 BASIC METABOLIC PNL TOTAL CA: CPT

## 2018-03-26 PROCEDURE — 85027 COMPLETE CBC AUTOMATED: CPT

## 2018-03-26 PROCEDURE — 36415 COLL VENOUS BLD VENIPUNCTURE: CPT

## 2018-03-30 ENCOUNTER — HOSPITAL ENCOUNTER (OUTPATIENT)
Dept: CARDIAC CATH/INVASIVE PROCEDURES | Age: 53
Discharge: HOME | End: 2018-03-30
Payer: OTHER GOVERNMENT

## 2018-03-30 VITALS
DIASTOLIC BLOOD PRESSURE: 74 MMHG | TEMPERATURE: 98 F | HEART RATE: 83 BPM | OXYGEN SATURATION: 95 % | RESPIRATION RATE: 16 BRPM | HEIGHT: 68 IN | BODY MASS INDEX: 33.34 KG/M2 | WEIGHT: 220 LBS | SYSTOLIC BLOOD PRESSURE: 116 MMHG

## 2018-03-30 PROCEDURE — 93458 L HRT ARTERY/VENTRICLE ANGIO: CPT | Performed by: FAMILY MEDICINE

## 2018-03-30 PROCEDURE — 2709999900 HC NON-CHARGEABLE SUPPLY

## 2018-03-30 PROCEDURE — C1894 INTRO/SHEATH, NON-LASER: HCPCS

## 2018-03-30 PROCEDURE — 2580000003 HC RX 258: Performed by: FAMILY MEDICINE

## 2018-03-30 PROCEDURE — C1887 CATHETER, GUIDING: HCPCS

## 2018-03-30 PROCEDURE — C1769 GUIDE WIRE: HCPCS

## 2018-03-30 PROCEDURE — 2500000003 HC RX 250 WO HCPCS

## 2018-03-30 PROCEDURE — 6360000002 HC RX W HCPCS

## 2018-03-30 PROCEDURE — C1725 CATH, TRANSLUMIN NON-LASER: HCPCS

## 2018-03-30 RX ORDER — NITROGLYCERIN 0.4 MG/1
0.4 TABLET SUBLINGUAL EVERY 5 MIN PRN
Status: DISCONTINUED | OUTPATIENT
Start: 2018-03-30 | End: 2018-03-31 | Stop reason: HOSPADM

## 2018-03-30 RX ORDER — ACETAMINOPHEN 325 MG/1
650 TABLET ORAL EVERY 4 HOURS PRN
Status: DISCONTINUED | OUTPATIENT
Start: 2018-03-30 | End: 2018-03-31 | Stop reason: HOSPADM

## 2018-03-30 RX ORDER — SODIUM CHLORIDE 0.9 % (FLUSH) 0.9 %
10 SYRINGE (ML) INJECTION PRN
Status: DISCONTINUED | OUTPATIENT
Start: 2018-03-30 | End: 2018-03-31 | Stop reason: HOSPADM

## 2018-03-30 RX ORDER — SODIUM CHLORIDE 9 MG/ML
INJECTION, SOLUTION INTRAVENOUS CONTINUOUS
Status: DISCONTINUED | OUTPATIENT
Start: 2018-03-30 | End: 2018-03-31 | Stop reason: HOSPADM

## 2018-03-30 RX ORDER — DIPHENHYDRAMINE HCL 25 MG
50 CAPSULE ORAL ONCE
Status: DISCONTINUED | OUTPATIENT
Start: 2018-03-30 | End: 2018-03-31 | Stop reason: HOSPADM

## 2018-03-30 RX ORDER — SODIUM CHLORIDE 0.9 % (FLUSH) 0.9 %
10 SYRINGE (ML) INJECTION EVERY 12 HOURS SCHEDULED
Status: DISCONTINUED | OUTPATIENT
Start: 2018-03-30 | End: 2018-03-31 | Stop reason: HOSPADM

## 2018-03-30 RX ADMIN — SODIUM CHLORIDE: 9 INJECTION, SOLUTION INTRAVENOUS at 08:50

## 2018-03-30 NOTE — H&P
Patient examined the patient and have reviewed H&P the from 18 and I agree with the current assessment and plan with no significant change in the patients condition. Patient: João Cardozo  : 1965  Date of Visit: 2018    REASON FOR VISIT / CONSULTATION:     Dear Precious Allen,      I had the pleasure of seeing João Cardozo in my office today. Ms. Zenaida Collado is a 47 y.o. female who recently underwent a cardiovascular stress test because of a history of chest disomfort   which shown evidence of reversible ischemia. When I saw her in my office back in January and at that time she was considering proceeding with a heart catheterization vs aggressive medical management and ended up calling me a couple weeks ago stating that she continues to be nervous due to symptoms and wanted to proceed with the heart catheterization. She denied any current or recent chest pain, abdominal pain, bleeding problems, problems with her medications or any other concerns at this time. Past Medical History:   Diagnosis Date    Depression     GERD (gastroesophageal reflux disease)     H/O cardiovascular stress test 2017    Significant electrocardiographic evidence of myocardial ischemia during EKG monitoring without significant associated arrhymias. The pt's Duke Treadmill score is 3.5 which correlates with an intermediate risk signifcant CAD.     H/O cardiovascular stress test 2017    Equivocal myocardial perfusion study. There is a small perfusion defect of mild intensity in the anterior and anteroapical regions during stress and rest imaging, which is most consistent with artifact but may be due to a small degree of coronary ischemia. EF was 69%. The pt's Duke Treadmill score is 0, which correlates with an intermediate risk for CAD. CURRENT ALLERGIES: Dilaudid [hydromorphone hcl];  Percocet [oxycodone-acetaminophen]; and Amoxicillin-pot clavulanate REVIEW OF SYSTEMS: 14 procedures. I also discussed the fact that although treatment with simple medical management is a potential treatment option in place of cardiac catheterization, I expressed my opinion that cardiac catheterization in order to define her coronary anatomy and rule out severe 3 vessel or left main coronary artery disease would significant help guide the most appropriate treatment strategy ranging from no treatment to medications, to stents, to even bypass surgery. Ms. Kwabena Noel verbalized understanding of the risks benefits and alternatives and stated that she would like to proceed with heart catheterization. I also discussed the advantages and disadvantages of having her procedure performed here at [facility] vs. a larger hospital such as [facility]. Beyond the obvious advantage of convenience, I also explained potential disadvantages including the inability to have immediate cardiac stenting performed or the presence of on site CT surgical backup. However, because of the lack of significant high risk feature on her stress test, I did tell her I thought that in her particular case, it would likely be safe to perform the procedure here. Therefore, after considering the options, Ms. Kwabena Noel said she would like to proceed with a heart catheterization and [facility] Therefore we have schedule the procedure to be performed later this morning. FOLLOW UP:   We will decide on this depending on the results  However, I would be happy to see her sooner should the need arise. Sincerely,  Stephan Capone MD, MS, F.A.C.C. [facility] Cardiology Specialist    [address], [address]  Phone: [phone], Fax: [phone]     I believe that the risk of significant morbidity and mortality related to the patient's current medical conditions are: intermediate-high.

## 2018-07-05 ENCOUNTER — OFFICE VISIT (OUTPATIENT)
Dept: CARDIOLOGY | Age: 53
End: 2018-07-05
Payer: OTHER GOVERNMENT

## 2018-07-05 VITALS
WEIGHT: 248 LBS | RESPIRATION RATE: 16 BRPM | OXYGEN SATURATION: 96 % | DIASTOLIC BLOOD PRESSURE: 73 MMHG | HEART RATE: 82 BPM | HEIGHT: 68 IN | BODY MASS INDEX: 37.59 KG/M2 | SYSTOLIC BLOOD PRESSURE: 111 MMHG

## 2018-07-05 DIAGNOSIS — Z01.810 PREOP CARDIOVASCULAR EXAM: ICD-10-CM

## 2018-07-05 DIAGNOSIS — R94.31 ABNORMAL ECG: Primary | ICD-10-CM

## 2018-07-05 DIAGNOSIS — E78.2 MIXED HYPERLIPIDEMIA: ICD-10-CM

## 2018-07-05 PROCEDURE — 93000 ELECTROCARDIOGRAM COMPLETE: CPT | Performed by: FAMILY MEDICINE

## 2018-07-05 PROCEDURE — 99213 OFFICE O/P EST LOW 20 MIN: CPT | Performed by: FAMILY MEDICINE

## 2018-07-05 RX ORDER — OLOPATADINE HYDROCHLORIDE 1 MG/ML
SOLUTION/ DROPS OPHTHALMIC
Status: ON HOLD | COMMUNITY
Start: 2018-06-18 | End: 2020-12-08

## 2018-07-05 RX ORDER — VORTIOXETINE 20 MG/1
20 TABLET, FILM COATED ORAL DAILY
COMMUNITY
Start: 2018-05-25

## 2018-07-05 RX ORDER — ALPRAZOLAM 0.25 MG/1
TABLET ORAL
COMMUNITY
Start: 2018-06-13

## 2018-07-05 NOTE — PROGRESS NOTES
Lobito Rodriguez CMA am scribing for and in the presence of Dr. Madan Leone    Patient: Fermín Meade  : 1965  Date of Visit: 2018    REASON FOR VISIT / CONSULTATION: Cardiac Clearance (Hx: Mild CAD. Pt states that she has having left foot surgery by Dr Sulema Riddle. Denies Cp, SOB, Palpitations, dizziness, lightheadedness. )      Dear Jenna Vargas and Dr Sulema Riddle,     I had the pleasure of seeing Fermín Meade in my office today. Ms. Reg Rizzo is a 47 y.o. female who underwent a cardiovascular stress test because of an abnormal ECG which had came back relatively normal. She had also underwent a cardiac catheterization on 3/30/2018 and fortunately had shown no significant coronary artery disease. Her echocardiogram was normal as well with an EF: 60% on 2018. Exercise Tolerance: She reports having a a good exercise tolerance. Ms. Reg Rizzo says that she could walk a mile without developing significant chest pain and/or shortness of breath. Ms. Reg Rizzo is going for left foot surgery by Dr Sulema Riddle and is here for a preoperative cardiac clearance for this. Ms. Reg Rizzo reports doing well since her last visit. She denied any current or recent chest pain, shortness of breath, abdominal pain, bleeding problems, problems with her medications or any other concerns at this time. Past Medical History:   Diagnosis Date    Depression     GERD (gastroesophageal reflux disease)     H/O cardiovascular stress test 2017    Significant electrocardiographic evidence of myocardial ischemia during EKG monitoring without significant associated arrhymias. The pt's Duke Treadmill score is 3.5 which correlates with an intermediate risk signifcant CAD.     H/O cardiovascular stress test 2017    Equivocal myocardial perfusion study.  There is a small perfusion defect of mild intensity in the anterior and anteroapical regions during stress and rest imaging, which is most consistent with artifact but may be due to a small degree of coronary ischemia. EF was 69%. The pt's Duke Treadmill score is 0, which correlates with an intermediate risk for CAD. CURRENT ALLERGIES: Dilaudid [hydromorphone hcl]; Percocet [oxycodone-acetaminophen]; and Amoxicillin-pot clavulanate REVIEW OF SYSTEMS: 10 systems were reviewed. Pertinent positives and negatives as above, all else negative. Past Surgical History:   Procedure Laterality Date    CHOLECYSTECTOMY      DILATION AND CURETTAGE OF UTERUS      FOOT SURGERY      bilateral    HYSTERECTOMY      KNEE ARTHROTOMY      TONSILLECTOMY      Social History:  Social History   Substance Use Topics    Smoking status: Former Smoker     Quit date: 7/2/2012    Smokeless tobacco: Never Used    Alcohol use Yes      Comment: rare        CURRENT MEDICATIONS:  Outpatient Prescriptions Marked as Taking for the 7/5/18 encounter (Office Visit) with Mariely Kelly MD   Medication Sig Dispense Refill    TRINTELLIX 20 MG TABS tablet 20 mg daily       ALPRAZolam (XANAX) 0.25 MG tablet       olopatadine (PATANOL) 0.1 % ophthalmic solution       omeprazole (PRILOSEC) 20 MG delayed release capsule Take 20 mg by mouth daily      CINNAMON PO Take 500 mg by mouth daily      Bioflavonoid Products (LIBRA C PO) Take by mouth daily      Biotin w/ Vitamins C & E (HAIR/SKIN/NAILS PO) Take by mouth daily      traZODone (DESYREL) 50 MG tablet Take 50 mg by mouth as needed for Sleep      montelukast (SINGULAIR) 10 MG tablet Take 10 mg by mouth as needed      Albuterol Sulfate (PROAIR HFA IN) Inhale into the lungs as needed      atorvastatin (LIPITOR) 20 MG tablet Take 1 tablet by mouth daily 90 tablet 3    buPROPion (WELLBUTRIN XL) 300 MG XL tablet Take 450 mg by mouth every morning.  estradiol (ESTRACE) 2 MG tablet Take 2 mg by mouth daily.  multivitamin (THERAGRAN) per tablet Take 1 tablet by mouth daily. FAMILY HISTORY: family history includes Stroke in her father and mother. relatively normal. No other testing is necessary. · Hyperlipidemia: Mixed  · Statin Therapy: Continue atorvastatin (Lipitor) 20 mg nightly. · Pre-Op Clearance: low risk of perioperative cardiac complications  Based on my evaluation of Ms. Brian Gardner, I do not believe that any further testing or intervention would be likely to decrease this risk and therefore recommend that you proceed with surgery as clinically indicated. · Medical management to reduce perioperative risk:  Additional Recommendations: I would also suggest that he continue her statin throughout the perioperative period. · Additional Testing: not indicated    In the meantime, I encouraged Ms. Veliz to continue to take her other medications. FOLLOW UP:   I told Ms. Brian Gardner to call my office if she had any problems, but otherwise I asked her to Return if symptoms worsen or fail to improve. However, I would be happy to see her sooner should the need arise. Sincerely,  Todd Phelps. Liborio GRIMES, MS, F.A.C.C. [facility] Cardiology Specialist    [address] [place] [address]  Phone: [phone], Fax: [phone]     I believe that the risk of significant morbidity and mortality related to the patient's current medical conditions are: low-intermediate. The documentation recorded by the scribe, accurately and completely reflects the services I personally performed and the decisions made by me. Gregory Francis.  Malvin Mejia MD, MS, F.A.C.C. 1/2/2018

## 2018-07-20 ENCOUNTER — HOSPITAL ENCOUNTER (OUTPATIENT)
Dept: GENERAL RADIOLOGY | Age: 53
Discharge: HOME | End: 2018-07-22
Payer: OTHER GOVERNMENT

## 2018-07-20 ENCOUNTER — HOSPITAL ENCOUNTER (OUTPATIENT)
Dept: LAB | Age: 53
Discharge: HOME | End: 2018-07-20
Payer: OTHER GOVERNMENT

## 2018-07-20 DIAGNOSIS — Z01.818 PRE-OP EXAM: ICD-10-CM

## 2018-07-20 LAB
ABSOLUTE EOS #: 0.06 K/UL (ref 0–0.44)
ABSOLUTE IMMATURE GRANULOCYTE: <0.03 K/UL (ref 0–0.3)
ABSOLUTE LYMPH #: 2.16 K/UL (ref 1.1–3.7)
ABSOLUTE MONO #: 0.48 K/UL (ref 0.1–1.2)
ANION GAP SERPL CALCULATED.3IONS-SCNC: 14 MMOL/L (ref 9–17)
BASOPHILS # BLD: 1 % (ref 0–2)
BASOPHILS ABSOLUTE: 0.03 K/UL (ref 0–0.2)
BILIRUBIN URINE: NEGATIVE
BUN BLDV-MCNC: 15 MG/DL (ref 6–20)
BUN/CREAT BLD: 18 (ref 9–20)
C-REACTIVE PROTEIN: 5.9 MG/L (ref 0–5)
CALCIUM SERPL-MCNC: 9.8 MG/DL (ref 8.6–10.4)
CHLORIDE BLD-SCNC: 101 MMOL/L (ref 98–107)
CO2: 26 MMOL/L (ref 20–31)
COLOR: YELLOW
COMMENT UA: NORMAL
CREAT SERPL-MCNC: 0.85 MG/DL (ref 0.5–0.9)
DIFFERENTIAL TYPE: NORMAL
EOSINOPHILS RELATIVE PERCENT: 1 % (ref 1–4)
GFR AFRICAN AMERICAN: >60 ML/MIN
GFR NON-AFRICAN AMERICAN: >60 ML/MIN
GFR SERPL CREATININE-BSD FRML MDRD: NORMAL ML/MIN/{1.73_M2}
GFR SERPL CREATININE-BSD FRML MDRD: NORMAL ML/MIN/{1.73_M2}
GLUCOSE BLD-MCNC: 90 MG/DL (ref 70–99)
GLUCOSE URINE: NEGATIVE
HCT VFR BLD CALC: 40.2 % (ref 36.3–47.1)
HEMOGLOBIN: 13.2 G/DL (ref 11.9–15.1)
IMMATURE GRANULOCYTES: 0 %
KETONES, URINE: NEGATIVE
LEUKOCYTE ESTERASE, URINE: NEGATIVE
LYMPHOCYTES # BLD: 36 % (ref 24–43)
MCH RBC QN AUTO: 28.2 PG (ref 25.2–33.5)
MCHC RBC AUTO-ENTMCNC: 32.8 G/DL (ref 28.4–34.8)
MCV RBC AUTO: 85.9 FL (ref 82.6–102.9)
MONOCYTES # BLD: 8 % (ref 3–12)
NITRITE, URINE: NEGATIVE
NRBC AUTOMATED: 0 PER 100 WBC
PDW BLD-RTO: 12.3 % (ref 11.8–14.4)
PH UA: 6 (ref 5–9)
PLATELET # BLD: 255 K/UL (ref 138–453)
PLATELET ESTIMATE: NORMAL
PMV BLD AUTO: 9.5 FL (ref 8.1–13.5)
POTASSIUM SERPL-SCNC: 4.8 MMOL/L (ref 3.7–5.3)
PROTEIN UA: NEGATIVE
RBC # BLD: 4.68 M/UL (ref 3.95–5.11)
RBC # BLD: NORMAL 10*6/UL
SEDIMENTATION RATE, ERYTHROCYTE: 15 MM (ref 0–20)
SEG NEUTROPHILS: 54 % (ref 36–65)
SEGMENTED NEUTROPHILS ABSOLUTE COUNT: 3.29 K/UL (ref 1.5–8.1)
SODIUM BLD-SCNC: 141 MMOL/L (ref 135–144)
SPECIFIC GRAVITY UA: 1.02 (ref 1.01–1.02)
TURBIDITY: CLEAR
URINE HGB: NEGATIVE
UROBILINOGEN, URINE: NORMAL
WBC # BLD: 6 K/UL (ref 3.5–11.3)
WBC # BLD: NORMAL 10*3/UL

## 2018-07-20 PROCEDURE — 71046 X-RAY EXAM CHEST 2 VIEWS: CPT

## 2018-07-20 PROCEDURE — 80048 BASIC METABOLIC PNL TOTAL CA: CPT

## 2018-07-20 PROCEDURE — 85651 RBC SED RATE NONAUTOMATED: CPT

## 2018-07-20 PROCEDURE — 81003 URINALYSIS AUTO W/O SCOPE: CPT

## 2018-07-20 PROCEDURE — 86140 C-REACTIVE PROTEIN: CPT

## 2018-07-20 PROCEDURE — 36415 COLL VENOUS BLD VENIPUNCTURE: CPT

## 2018-07-20 PROCEDURE — 85025 COMPLETE CBC W/AUTO DIFF WBC: CPT

## 2018-08-03 RX ORDER — ESTRADIOL 2 MG/1
2 TABLET ORAL DAILY
Qty: 30 TABLET | Refills: 3 | Status: SHIPPED | OUTPATIENT
Start: 2018-08-03 | End: 2020-02-19

## 2018-08-03 NOTE — TELEPHONE ENCOUNTER
JOANA for pt letting her know we did refill this Rx but she does need to call back and schedule for her yearly

## 2018-09-04 RX ORDER — ATORVASTATIN CALCIUM 20 MG/1
20 TABLET, FILM COATED ORAL DAILY
Qty: 90 TABLET | Refills: 3 | Status: SHIPPED | OUTPATIENT
Start: 2018-09-04 | End: 2020-02-19 | Stop reason: ALTCHOICE

## 2018-10-15 ENCOUNTER — HOSPITAL ENCOUNTER (OUTPATIENT)
Dept: WOMENS IMAGING | Age: 53
Discharge: HOME | End: 2018-10-17
Payer: OTHER GOVERNMENT

## 2018-10-15 DIAGNOSIS — Z12.39 ENCOUNTER FOR OTHER SCREENING FOR MALIGNANT NEOPLASM OF BREAST: ICD-10-CM

## 2018-10-15 DIAGNOSIS — Z12.39 BREAST SCREENING: ICD-10-CM

## 2018-10-15 PROCEDURE — 77067 SCR MAMMO BI INCL CAD: CPT

## 2018-12-04 ENCOUNTER — OFFICE VISIT (OUTPATIENT)
Dept: OBGYN | Age: 53
End: 2018-12-04
Payer: OTHER GOVERNMENT

## 2018-12-04 ENCOUNTER — HOSPITAL ENCOUNTER (OUTPATIENT)
Age: 53
Setting detail: SPECIMEN
Discharge: HOME | End: 2018-12-04
Payer: OTHER GOVERNMENT

## 2018-12-04 VITALS
BODY MASS INDEX: 36.83 KG/M2 | SYSTOLIC BLOOD PRESSURE: 130 MMHG | WEIGHT: 243 LBS | DIASTOLIC BLOOD PRESSURE: 78 MMHG | HEIGHT: 68 IN

## 2018-12-04 DIAGNOSIS — Z78.0 MENOPAUSE: ICD-10-CM

## 2018-12-04 DIAGNOSIS — Z01.419 ENCOUNTER FOR ANNUAL ROUTINE GYNECOLOGICAL EXAMINATION: ICD-10-CM

## 2018-12-04 DIAGNOSIS — Z01.419 ENCOUNTER FOR ANNUAL ROUTINE GYNECOLOGICAL EXAMINATION: Primary | ICD-10-CM

## 2018-12-04 DIAGNOSIS — N39.3 STRESS INCONTINENCE: ICD-10-CM

## 2018-12-04 PROCEDURE — 99396 PREV VISIT EST AGE 40-64: CPT | Performed by: ADVANCED PRACTICE MIDWIFE

## 2018-12-04 PROCEDURE — G0145 SCR C/V CYTO,THINLAYER,RESCR: HCPCS

## 2018-12-04 RX ORDER — ESTRADIOL 1 MG/1
1 TABLET ORAL DAILY
Qty: 90 TABLET | Refills: 3 | Status: SHIPPED | OUTPATIENT
Start: 2018-12-04 | End: 2019-10-20 | Stop reason: SDUPTHER

## 2018-12-04 ASSESSMENT — PATIENT HEALTH QUESTIONNAIRE - PHQ9
SUM OF ALL RESPONSES TO PHQ QUESTIONS 1-9: 0
1. LITTLE INTEREST OR PLEASURE IN DOING THINGS: 0
SUM OF ALL RESPONSES TO PHQ9 QUESTIONS 1 & 2: 0
2. FEELING DOWN, DEPRESSED OR HOPELESS: 0
SUM OF ALL RESPONSES TO PHQ QUESTIONS 1-9: 0

## 2018-12-04 ASSESSMENT — ENCOUNTER SYMPTOMS
SHORTNESS OF BREATH: 0
SORE THROAT: 0
DIARRHEA: 0
CONSTIPATION: 0
ABDOMINAL PAIN: 0
NAUSEA: 0
BACK PAIN: 0

## 2018-12-04 NOTE — PROGRESS NOTES
absent, Tenderness absent  Anus/Perineum:  Lesions absent and Masses absent                                                               Assessment and Plan          Diagnosis Orders   1. Encounter for annual routine gynecological examination  PAP SMEAR    PEDRO LUIS DIGITAL SCREEN W CAD BILATERAL   2. Menopause  estradiol (ESTRACE) 1 MG tablet   3. Stress incontinence               I have discontinued Ms. Champagne CINNAMON PO and Bioflavonoid Products (LIBRA C PO). I am also having her start on estradiol. Additionally, I am having her maintain her buPROPion, multivitamin, omeprazole, Biotin w/ Vitamins C & E (HAIR/SKIN/NAILS PO), traZODone, montelukast, Albuterol Sulfate (PROAIR HFA IN), TRINTELLIX, ALPRAZolam, olopatadine, estradiol, and atorvastatin. Return in about 1 year (around 12/4/2019) for yearly. She was also counseled on her preventative health maintenance recommendations and follow-up. There are no Patient Instructions on file for this visit. Daphne Kirby,12/4/2018 11:12 AM                                           Assessment and Plan          Diagnosis Orders   1. Encounter for annual routine gynecological examination  PAP SMEAR    PEDRO LUIS DIGITAL SCREEN W CAD BILATERAL   2. Menopause  estradiol (ESTRACE) 1 MG tablet   3. Stress incontinence         considering ditropan vs surgical consult for incontinence, if desires will refer to DR. Kulkarni to discuss mini arc sling      I have discontinued Ms. Champagne CINNAMON PO and Bioflavonoid Products (LIBRA C PO). I am also having her start on estradiol. Additionally, I am having her maintain her buPROPion, multivitamin, omeprazole, Biotin w/ Vitamins C & E (HAIR/SKIN/NAILS PO), traZODone, montelukast, Albuterol Sulfate (PROAIR HFA IN), TRINTELLIX, ALPRAZolam, olopatadine, estradiol, and atorvastatin. Return in about 1 year (around 12/4/2019) for yearly.     She was also counseled on her preventative health maintenance recommendations and

## 2018-12-05 DIAGNOSIS — Z78.0 MENOPAUSE: Primary | ICD-10-CM

## 2018-12-06 RX ORDER — ESTRADIOL 1 MG/1
1 TABLET ORAL DAILY
Qty: 30 TABLET | Refills: 3 | Status: SHIPPED | OUTPATIENT
Start: 2018-12-06 | End: 2020-03-11

## 2018-12-15 LAB — CYTOLOGY REPORT: NORMAL

## 2019-03-20 ENCOUNTER — HOSPITAL ENCOUNTER (OUTPATIENT)
Dept: BONE DENSITY | Age: 54
Discharge: HOME | End: 2019-03-22
Payer: OTHER GOVERNMENT

## 2019-03-20 DIAGNOSIS — Z13.820 OSTEOPOROSIS SCREENING: ICD-10-CM

## 2019-03-20 DIAGNOSIS — Z78.0 MENOPAUSE: ICD-10-CM

## 2019-03-20 PROCEDURE — 77080 DXA BONE DENSITY AXIAL: CPT

## 2020-01-29 ENCOUNTER — HOSPITAL ENCOUNTER (OUTPATIENT)
Dept: GENERAL RADIOLOGY | Age: 55
Discharge: HOME | End: 2020-01-31
Payer: OTHER GOVERNMENT

## 2020-01-29 ENCOUNTER — HOSPITAL ENCOUNTER (OUTPATIENT)
Age: 55
Discharge: HOME | End: 2020-01-31
Payer: OTHER GOVERNMENT

## 2020-01-29 PROCEDURE — 72110 X-RAY EXAM L-2 SPINE 4/>VWS: CPT

## 2020-01-29 PROCEDURE — 73522 X-RAY EXAM HIPS BI 3-4 VIEWS: CPT

## 2020-02-19 ENCOUNTER — OFFICE VISIT (OUTPATIENT)
Dept: OBGYN | Age: 55
End: 2020-02-19
Payer: OTHER GOVERNMENT

## 2020-02-19 VITALS
HEIGHT: 68 IN | SYSTOLIC BLOOD PRESSURE: 132 MMHG | BODY MASS INDEX: 32.43 KG/M2 | DIASTOLIC BLOOD PRESSURE: 80 MMHG | WEIGHT: 214 LBS

## 2020-02-19 PROCEDURE — 99213 OFFICE O/P EST LOW 20 MIN: CPT | Performed by: ADVANCED PRACTICE MIDWIFE

## 2020-02-19 RX ORDER — MELOXICAM 7.5 MG/1
TABLET ORAL
Status: ON HOLD | COMMUNITY
Start: 2020-01-29 | End: 2020-08-11

## 2020-02-19 RX ORDER — FLUTICASONE PROPIONATE 50 MCG
SPRAY, SUSPENSION (ML) NASAL
COMMUNITY
Start: 2019-12-16

## 2020-02-19 ASSESSMENT — PATIENT HEALTH QUESTIONNAIRE - PHQ9
SUM OF ALL RESPONSES TO PHQ9 QUESTIONS 1 & 2: 0
1. LITTLE INTEREST OR PLEASURE IN DOING THINGS: 0
SUM OF ALL RESPONSES TO PHQ QUESTIONS 1-9: 0
SUM OF ALL RESPONSES TO PHQ QUESTIONS 1-9: 0
2. FEELING DOWN, DEPRESSED OR HOPELESS: 0

## 2020-02-19 NOTE — PROGRESS NOTES
PROBLEM VISIT     Date of service: 2020    Addie Maciel  Is a 49 y.o.  female    PT's PCP is: Marychuy Connors DO     : 1965                                             Subjective:       No LMP recorded. Patient has had a hysterectomy. OB History    Para Term  AB Living   0 0 0 0 0 0   SAB TAB Ectopic Molar Multiple Live Births   0 0 0 0 0 0        Social History     Tobacco Use   Smoking Status Former Smoker    Last attempt to quit: 2012    Years since quittin.6   Smokeless Tobacco Never Used        Social History     Substance and Sexual Activity   Alcohol Use Yes    Comment: rare       Allergies: Dilaudid [hydromorphone hcl]; Percocet [oxycodone-acetaminophen]; and Amoxicillin-pot clavulanate      Current Outpatient Medications:     meloxicam (MOBIC) 7.5 MG tablet, TAKE 1 TABLET BY MOUTH ONCE DAILY FOR 30 DAYS, Disp: , Rfl:     fluticasone (FLONASE) 50 MCG/ACT nasal spray, USE 1 SPRAY(S) IN EACH NOSTRIL TWICE DAILY FOR 30 DAYS, Disp: , Rfl:     estradiol (ESTRACE) 1 MG tablet, Take 1 tablet by mouth daily, Disp: 30 tablet, Rfl: 3    TRINTELLIX 20 MG TABS tablet, 20 mg daily , Disp: , Rfl:     ALPRAZolam (XANAX) 0.25 MG tablet, , Disp: , Rfl:     olopatadine (PATANOL) 0.1 % ophthalmic solution, , Disp: , Rfl:     omeprazole (PRILOSEC) 20 MG delayed release capsule, Take 20 mg by mouth daily, Disp: , Rfl:     traZODone (DESYREL) 50 MG tablet, Take 50 mg by mouth as needed for Sleep, Disp: , Rfl:     montelukast (SINGULAIR) 10 MG tablet, Take 10 mg by mouth as needed, Disp: , Rfl:     Albuterol Sulfate (PROAIR HFA IN), Inhale into the lungs as needed, Disp: , Rfl:     buPROPion (WELLBUTRIN XL) 300 MG XL tablet, Take 450 mg by mouth every morning., Disp: , Rfl:     multivitamin (THERAGRAN) per tablet, Take 1 tablet by mouth daily.   , Disp: , Rfl:     Social History     Substance and Sexual Activity   Sexual Activity Yes    Partners: Male       Last Yearly:  12/2018    Last pap: 12/2018    Last HPV: na    Chief Complaint   Patient presents with    Mass     C/O vulvar bumps for approx. 1 month. Non painful. PE:  Vital Signs  Blood pressure 132/80, height 5' 8\" (1.727 m), weight 214 lb (97.1 kg), not currently breastfeeding. NURSE: Shay LPHUMBERTO    HPI: here for 1 month of noticing vuvlar bumps     PT denies fever, chills, nausea and vomiting       Objective   No acute distress  Excellent communications  Well-nourished  Lymphatic:   no lymphadenopathy     Pelvic Exam: GENITAL/URINARY:  External Genitalia:  Hair distribution; normal, Lesions absent, left labia minora/ harts line with several angiokeratomas/ 2-3 mm purple bumps  Urethral Meatus:  Size normal, Location normal, Lesions absent, Prolapse absent  Urethra: Fullness absent, Masses absent  Bladder:  Fullness absent, Masses absent, Tenderness absent, Cystocele absent  Vagina:  General appearance normal, Estrogen effect normal, Discharge absent, Lesions absent, Pelvic support normal                                    Vaginal discharge: no vaginal discharge                        Results reviewed today:    No results found for this visit on 02/19/20. Assessment and Plan          Diagnosis Orders   1. Angiokeratoma of vulva         reassurance given, danger signs reviewed      I have discontinued Karine Jojo. YESSI'Millian's Biotin w/ Vitamins C & E (HAIR/SKIN/NAILS PO) and atorvastatin. I am also having her maintain her buPROPion, multivitamin, omeprazole, traZODone, montelukast, Albuterol Sulfate (PROAIR HFA IN), Trintellix, ALPRAZolam, olopatadine, estradiol, meloxicam, and fluticasone. Return if symptoms worsen or fail to improve. There are no Patient Instructions on file for this visit. Over 50% of time spent on counseling and care coordination on: see assessment and plan,  She was also counseled on her preventative health maintenance recommendations and follow-up.         FF time: 15

## 2020-03-11 RX ORDER — ESTRADIOL 1 MG/1
TABLET ORAL
Qty: 90 TABLET | Refills: 3 | Status: SHIPPED | OUTPATIENT
Start: 2020-03-11 | End: 2021-01-18

## 2020-06-17 ENCOUNTER — OFFICE VISIT (OUTPATIENT)
Dept: OBGYN | Age: 55
End: 2020-06-17
Payer: OTHER GOVERNMENT

## 2020-06-17 VITALS
HEIGHT: 68 IN | SYSTOLIC BLOOD PRESSURE: 120 MMHG | WEIGHT: 205.4 LBS | BODY MASS INDEX: 31.13 KG/M2 | DIASTOLIC BLOOD PRESSURE: 76 MMHG

## 2020-06-17 PROCEDURE — 99396 PREV VISIT EST AGE 40-64: CPT | Performed by: ADVANCED PRACTICE MIDWIFE

## 2020-06-17 RX ORDER — ESTRADIOL 1 MG/1
1 TABLET ORAL DAILY
Qty: 90 TABLET | Refills: 3 | Status: ON HOLD | OUTPATIENT
Start: 2020-06-17 | End: 2020-09-08

## 2020-06-17 ASSESSMENT — PATIENT HEALTH QUESTIONNAIRE - PHQ9
SUM OF ALL RESPONSES TO PHQ9 QUESTIONS 1 & 2: 0
SUM OF ALL RESPONSES TO PHQ QUESTIONS 1-9: 0
2. FEELING DOWN, DEPRESSED OR HOPELESS: 0
1. LITTLE INTEREST OR PLEASURE IN DOING THINGS: 0
SUM OF ALL RESPONSES TO PHQ QUESTIONS 1-9: 0

## 2020-06-17 ASSESSMENT — ENCOUNTER SYMPTOMS
SORE THROAT: 0
BACK PAIN: 0
SHORTNESS OF BREATH: 0
ABDOMINAL PAIN: 0

## 2020-06-17 NOTE — PROGRESS NOTES
needed, Disp: , Rfl:     Albuterol Sulfate (PROAIR HFA IN), Inhale into the lungs as needed, Disp: , Rfl:     buPROPion (WELLBUTRIN XL) 300 MG XL tablet, Take 450 mg by mouth every morning., Disp: , Rfl:     multivitamin (THERAGRAN) per tablet, Take 1 tablet by mouth daily. , Disp: , Rfl:     meloxicam (MOBIC) 7.5 MG tablet, TAKE 1 TABLET BY MOUTH ONCE DAILY FOR 30 DAYS, Disp: , Rfl:     Social History     Substance and Sexual Activity   Sexual Activity Yes    Partners: Male       Any bleeding or pain with intercourse: No    Last Yearly:  2018    Last pap:2018    Last HPV: 2014    Last Mammogram: scheduled 07/07/2020    Last Dexascan 03/2019    Last colorectal screen- type:colonscopy*  date  2018    Do you do self breast exams: Yes    Past Medical History:   Diagnosis Date    Depression     GERD (gastroesophageal reflux disease)     H/O cardiovascular stress test 12/13/2017    Significant electrocardiographic evidence of myocardial ischemia during EKG monitoring without significant associated arrhymias. The pt's Duke Treadmill score is 3.5 which correlates with an intermediate risk signifcant CAD.     H/O cardiovascular stress test 12/26/2017    Equivocal myocardial perfusion study. There is a small perfusion defect of mild intensity in the anterior and anteroapical regions during stress and rest imaging, which is most consistent with artifact but may be due to a small degree of coronary ischemia. EF was 69%. The pt's Duke Treadmill score is 0, which correlates with an intermediate risk for CAD.         Past Surgical History:   Procedure Laterality Date    CHOLECYSTECTOMY      DILATION AND CURETTAGE OF UTERUS      FOOT SURGERY      bilateral    HYSTERECTOMY      KNEE ARTHROTOMY      OVARIAN CYST REMOVAL      bilateral    OVARY REMOVAL      bilateral    TONSILLECTOMY         Family History   Problem Relation Age of Onset    Stroke Mother         59years old   [facility] Stroke Father         mid 74s   

## 2020-07-07 ENCOUNTER — HOSPITAL ENCOUNTER (OUTPATIENT)
Dept: WOMENS IMAGING | Age: 55
Discharge: HOME | End: 2020-07-09
Payer: OTHER GOVERNMENT

## 2020-07-07 PROCEDURE — 77063 BREAST TOMOSYNTHESIS BI: CPT

## 2020-07-10 ENCOUNTER — HOSPITAL ENCOUNTER (OUTPATIENT)
Dept: LAB | Age: 55
Discharge: HOME | End: 2020-07-10
Payer: OTHER GOVERNMENT

## 2020-07-10 DIAGNOSIS — Z01.818 PREOP TESTING: Primary | ICD-10-CM

## 2020-07-10 PROCEDURE — U0003 INFECTIOUS AGENT DETECTION BY NUCLEIC ACID (DNA OR RNA); SEVERE ACUTE RESPIRATORY SYNDROME CORONAVIRUS 2 (SARS-COV-2) (CORONAVIRUS DISEASE [COVID-19]), AMPLIFIED PROBE TECHNIQUE, MAKING USE OF HIGH THROUGHPUT TECHNOLOGIES AS DESCRIBED BY CMS-2020-01-R: HCPCS

## 2020-07-14 ENCOUNTER — APPOINTMENT (OUTPATIENT)
Dept: GENERAL RADIOLOGY | Age: 55
End: 2020-07-14
Attending: ANESTHESIOLOGY
Payer: OTHER GOVERNMENT

## 2020-07-14 ENCOUNTER — HOSPITAL ENCOUNTER (OUTPATIENT)
Age: 55
Setting detail: OUTPATIENT SURGERY
Discharge: HOME | End: 2020-07-14
Attending: ANESTHESIOLOGY | Admitting: ANESTHESIOLOGY
Payer: OTHER GOVERNMENT

## 2020-07-14 VITALS
RESPIRATION RATE: 16 BRPM | BODY MASS INDEX: 30.01 KG/M2 | SYSTOLIC BLOOD PRESSURE: 115 MMHG | HEART RATE: 78 BPM | OXYGEN SATURATION: 97 % | WEIGHT: 198 LBS | DIASTOLIC BLOOD PRESSURE: 76 MMHG | HEIGHT: 68 IN | TEMPERATURE: 97.2 F

## 2020-07-14 LAB
SARS-COV-2, PCR: NORMAL
SARS-COV-2, RAPID: NOT DETECTED
SARS-COV-2: NORMAL
SOURCE: NORMAL

## 2020-07-14 PROCEDURE — 3600000002 HC SURGERY LEVEL 2 BASE: Performed by: ANESTHESIOLOGY

## 2020-07-14 PROCEDURE — U0002 COVID-19 LAB TEST NON-CDC: HCPCS

## 2020-07-14 PROCEDURE — 6360000002 HC RX W HCPCS: Performed by: ANESTHESIOLOGY

## 2020-07-14 PROCEDURE — 7100000010 HC PHASE II RECOVERY - FIRST 15 MIN: Performed by: ANESTHESIOLOGY

## 2020-07-14 PROCEDURE — 2709999900 HC NON-CHARGEABLE SUPPLY: Performed by: ANESTHESIOLOGY

## 2020-07-14 PROCEDURE — 7100000011 HC PHASE II RECOVERY - ADDTL 15 MIN: Performed by: ANESTHESIOLOGY

## 2020-07-14 PROCEDURE — 2580000003 HC RX 258: Performed by: ANESTHESIOLOGY

## 2020-07-14 PROCEDURE — 3209999900 FLUORO FOR SURGICAL PROCEDURES

## 2020-07-14 PROCEDURE — 2500000003 HC RX 250 WO HCPCS: Performed by: ANESTHESIOLOGY

## 2020-07-14 PROCEDURE — 99152 MOD SED SAME PHYS/QHP 5/>YRS: CPT | Performed by: ANESTHESIOLOGY

## 2020-07-14 RX ORDER — MIDAZOLAM HYDROCHLORIDE 1 MG/ML
INJECTION INTRAMUSCULAR; INTRAVENOUS PRN
Status: DISCONTINUED | OUTPATIENT
Start: 2020-07-14 | End: 2020-07-14 | Stop reason: ALTCHOICE

## 2020-07-14 RX ORDER — SODIUM CHLORIDE 0.9 % (FLUSH) 0.9 %
10 SYRINGE (ML) INJECTION EVERY 12 HOURS SCHEDULED
Status: DISCONTINUED | OUTPATIENT
Start: 2020-07-14 | End: 2020-07-14 | Stop reason: HOSPADM

## 2020-07-14 RX ORDER — SODIUM CHLORIDE, SODIUM LACTATE, POTASSIUM CHLORIDE, CALCIUM CHLORIDE 600; 310; 30; 20 MG/100ML; MG/100ML; MG/100ML; MG/100ML
INJECTION, SOLUTION INTRAVENOUS CONTINUOUS
Status: DISCONTINUED | OUTPATIENT
Start: 2020-07-14 | End: 2020-07-14 | Stop reason: HOSPADM

## 2020-07-14 RX ORDER — METHYLPREDNISOLONE ACETATE 40 MG/ML
INJECTION, SUSPENSION INTRA-ARTICULAR; INTRALESIONAL; INTRAMUSCULAR; SOFT TISSUE PRN
Status: DISCONTINUED | OUTPATIENT
Start: 2020-07-14 | End: 2020-07-14 | Stop reason: ALTCHOICE

## 2020-07-14 RX ORDER — LIDOCAINE HYDROCHLORIDE 10 MG/ML
INJECTION, SOLUTION EPIDURAL; INFILTRATION; INTRACAUDAL; PERINEURAL PRN
Status: DISCONTINUED | OUTPATIENT
Start: 2020-07-14 | End: 2020-07-14 | Stop reason: ALTCHOICE

## 2020-07-14 RX ORDER — SODIUM CHLORIDE 0.9 % (FLUSH) 0.9 %
10 SYRINGE (ML) INJECTION PRN
Status: DISCONTINUED | OUTPATIENT
Start: 2020-07-14 | End: 2020-07-14 | Stop reason: HOSPADM

## 2020-07-14 RX ORDER — BUPIVACAINE HYDROCHLORIDE 5 MG/ML
INJECTION, SOLUTION EPIDURAL; INTRACAUDAL PRN
Status: DISCONTINUED | OUTPATIENT
Start: 2020-07-14 | End: 2020-07-14 | Stop reason: ALTCHOICE

## 2020-07-14 RX ADMIN — SODIUM CHLORIDE, POTASSIUM CHLORIDE, SODIUM LACTATE AND CALCIUM CHLORIDE: 600; 310; 30; 20 INJECTION, SOLUTION INTRAVENOUS at 13:27

## 2020-07-14 ASSESSMENT — PAIN DESCRIPTION - DESCRIPTORS: DESCRIPTORS: ACHING;STABBING

## 2020-07-14 ASSESSMENT — PAIN SCALES - GENERAL
PAINLEVEL_OUTOF10: 0

## 2020-07-14 ASSESSMENT — PAIN - FUNCTIONAL ASSESSMENT: PAIN_FUNCTIONAL_ASSESSMENT: 0-10

## 2020-07-14 NOTE — PROGRESS NOTES
Discharge instructions given to patient and  with understanding voiced.  No questions asked at this time    Back injection site clean and dry

## 2020-07-14 NOTE — PROGRESS NOTES

## 2020-07-14 NOTE — OP NOTE
Procedure Note    Patient Name: Gonzalo Angulo   YOB: 1965  Room/Bed: Room/bed info not found  Medical Record Number: [id number]  Date: 7/14/2020       Mallampati Airway Assessment:  Mallampati Class II - (soft palate, fauces & uvula are visible)    ASA Classification:  Class 2 - A normal healthy patient with mild systemic disease        Preoperative Diagnosis:    1. Degenerative lumbar disc disease. 2.  Lumbar spondylosis. 3.  Facet joint arthropathy. Postoperative Diagnosis:   1. Degenerative lumbar disc disease. 2.  Lumbar spondylosis. 3.  Facet joint arthropathy. Procedure Performed[de-identified]  Lumbar facet joint injections at the levels of  L4 - 5 and L5 - S1 on the Left side with fluoroscopy guidance, with IV sedation. Indication for the Procedure: The patient had history of chronic low back pain which is not responding well to the conservative treatment. The patient's pain is mostly axial in nature. Pain is interfering with the activities of daily living. Physical examination revealed facet tenderness and facet loading is positive. We decided to try lumbar facet joint injection for diagnostic as well as for therapeutic purposes. The procedure and its risks were discussed with the patient and an informed consent was obtained. .  Procedure: After starting IV patient was sedated with 2 mg of Midazolam intravenously by the RN under my direct supervision for extreme anxiety. Patient's vital signs including BP, EKG and SaO2 were monitored by RN and they remained stable during the procedure. A meaningful communication was kept up with the patient throughout   the procedure. The patient is placed in prone position. Skin over the back was prepped and draped in sterile manner. Under fluoroscopy the facet joints were identified and were palpated, and the following joints were found to be tender:   L4 - 5 and L5 - S1 on the Left side.   Hence we decided to inject these joints in the following way:  Using fluoroscopy the facet joints were identified and by adjusting the angle of the fluoroscopy in the oblique position, the view of the joint space was optimized. The skin and deep tissues over the joint space were anesthetized with 6 ml of Lidocaine 1%. Then a #23-gauge, 3-1/2 inch spinal needle was introduced through the skin wheal under fluoroscopoc guidance such that the tip of the needle lies in the joint space. Then after negative aspiration a mixture of 0.5% Marcaine with methylprednisolone was injected into the joint space. This was done at the levels of  L4 - 5 and L5 - S1 on the Left side. A total of 40 mg of Depo-Medrol and 2 ml of 0.5% Marcaine was used and was divided in equal amounts among the joints. After removing the needles Band-Aids were placed over the needle insertion sites. Patient's vital signs remained stable and tolerated the procedure well. The patient was discharged home in stable condition and will be followed in the pain clinic in the next few weeks for further planning.     EBL: None

## 2020-07-15 LAB — SARS-COV-2, NAA: NOT DETECTED

## 2020-07-16 ENCOUNTER — TELEPHONE (OUTPATIENT)
Dept: FAMILY MEDICINE CLINIC | Age: 55
End: 2020-07-16

## 2020-08-11 ENCOUNTER — HOSPITAL ENCOUNTER (OUTPATIENT)
Age: 55
Setting detail: OUTPATIENT SURGERY
Discharge: HOME | End: 2020-08-11
Attending: ANESTHESIOLOGY | Admitting: ANESTHESIOLOGY
Payer: OTHER GOVERNMENT

## 2020-08-11 ENCOUNTER — APPOINTMENT (OUTPATIENT)
Dept: GENERAL RADIOLOGY | Age: 55
End: 2020-08-11
Attending: ANESTHESIOLOGY
Payer: OTHER GOVERNMENT

## 2020-08-11 VITALS
RESPIRATION RATE: 16 BRPM | HEART RATE: 83 BPM | TEMPERATURE: 98.9 F | SYSTOLIC BLOOD PRESSURE: 129 MMHG | HEIGHT: 68 IN | BODY MASS INDEX: 29.55 KG/M2 | DIASTOLIC BLOOD PRESSURE: 79 MMHG | OXYGEN SATURATION: 97 % | WEIGHT: 195 LBS

## 2020-08-11 PROCEDURE — 2709999900 HC NON-CHARGEABLE SUPPLY: Performed by: ANESTHESIOLOGY

## 2020-08-11 PROCEDURE — 3600000002 HC SURGERY LEVEL 2 BASE: Performed by: ANESTHESIOLOGY

## 2020-08-11 PROCEDURE — 2500000003 HC RX 250 WO HCPCS: Performed by: ANESTHESIOLOGY

## 2020-08-11 PROCEDURE — 3209999900 FLUORO FOR SURGICAL PROCEDURES

## 2020-08-11 RX ORDER — SODIUM CHLORIDE, SODIUM LACTATE, POTASSIUM CHLORIDE, CALCIUM CHLORIDE 600; 310; 30; 20 MG/100ML; MG/100ML; MG/100ML; MG/100ML
INJECTION, SOLUTION INTRAVENOUS CONTINUOUS
Status: DISCONTINUED | OUTPATIENT
Start: 2020-08-11 | End: 2020-08-11 | Stop reason: HOSPADM

## 2020-08-11 RX ORDER — SODIUM CHLORIDE 0.9 % (FLUSH) 0.9 %
10 SYRINGE (ML) INJECTION PRN
Status: DISCONTINUED | OUTPATIENT
Start: 2020-08-11 | End: 2020-08-11 | Stop reason: HOSPADM

## 2020-08-11 RX ORDER — BUPIVACAINE HYDROCHLORIDE 5 MG/ML
INJECTION, SOLUTION EPIDURAL; INTRACAUDAL PRN
Status: DISCONTINUED | OUTPATIENT
Start: 2020-08-11 | End: 2020-08-11 | Stop reason: ALTCHOICE

## 2020-08-11 RX ORDER — LIDOCAINE HYDROCHLORIDE 10 MG/ML
INJECTION, SOLUTION EPIDURAL; INFILTRATION; INTRACAUDAL; PERINEURAL PRN
Status: DISCONTINUED | OUTPATIENT
Start: 2020-08-11 | End: 2020-08-11 | Stop reason: ALTCHOICE

## 2020-08-11 RX ORDER — SODIUM CHLORIDE 0.9 % (FLUSH) 0.9 %
10 SYRINGE (ML) INJECTION EVERY 12 HOURS SCHEDULED
Status: DISCONTINUED | OUTPATIENT
Start: 2020-08-11 | End: 2020-08-11 | Stop reason: HOSPADM

## 2020-08-11 ASSESSMENT — PAIN DESCRIPTION - DESCRIPTORS: DESCRIPTORS: ACHING

## 2020-08-11 ASSESSMENT — PAIN - FUNCTIONAL ASSESSMENT
PAIN_FUNCTIONAL_ASSESSMENT: PREVENTS OR INTERFERES SOME ACTIVE ACTIVITIES AND ADLS
PAIN_FUNCTIONAL_ASSESSMENT: 0-10

## 2020-08-11 NOTE — PROGRESS NOTES
Discharge instructions reviewed with patient; patient received no sedation. Implies understanding. Rates pain 0. Site clear.

## 2020-08-11 NOTE — OP NOTE
the skin wheal under fluoroscopy guidance such that the tip of the needle lies at the junction of the transverse process with the superior processes of the facet joint. Then after negative aspiration a total of 0.5 ml of 0.5% Marcaine was injected through the needle. This was done at the levels of L3, L4, L5  For L5 Median branch block the junction of the ala of  the sacrum with the superior articular process of the facet joint was taken as a reference point and L4 median branch the junction of the transverse process the L5 with the superolateral possible facet joint was taken to the point and healthy median branch the junction of the transverse process of L4 with the superior lateral process of the facet joint was taken as a reference point. Patient's vital signs and neurological status remained stable through  the procedure and post procedural  Period. Patient was instructed to keep track of pain for next 24 hours. every hour and bring it with next visit. Patient tollerated the procedure well and was discharged home in stable condition.     EBL: None

## 2020-09-08 ENCOUNTER — HOSPITAL ENCOUNTER (OUTPATIENT)
Age: 55
Setting detail: OUTPATIENT SURGERY
Discharge: HOME | End: 2020-09-08
Attending: ANESTHESIOLOGY | Admitting: ANESTHESIOLOGY
Payer: OTHER GOVERNMENT

## 2020-09-08 ENCOUNTER — APPOINTMENT (OUTPATIENT)
Dept: GENERAL RADIOLOGY | Age: 55
End: 2020-09-08
Attending: ANESTHESIOLOGY
Payer: OTHER GOVERNMENT

## 2020-09-08 VITALS
SYSTOLIC BLOOD PRESSURE: 109 MMHG | DIASTOLIC BLOOD PRESSURE: 74 MMHG | OXYGEN SATURATION: 98 % | HEART RATE: 72 BPM | BODY MASS INDEX: 29.4 KG/M2 | TEMPERATURE: 98.7 F | HEIGHT: 68 IN | RESPIRATION RATE: 16 BRPM | WEIGHT: 194 LBS

## 2020-09-08 PROCEDURE — 2720000010 HC SURG SUPPLY STERILE: Performed by: ANESTHESIOLOGY

## 2020-09-08 PROCEDURE — 7100000010 HC PHASE II RECOVERY - FIRST 15 MIN: Performed by: ANESTHESIOLOGY

## 2020-09-08 PROCEDURE — 2709999900 HC NON-CHARGEABLE SUPPLY: Performed by: ANESTHESIOLOGY

## 2020-09-08 PROCEDURE — 3600000002 HC SURGERY LEVEL 2 BASE: Performed by: ANESTHESIOLOGY

## 2020-09-08 PROCEDURE — 99152 MOD SED SAME PHYS/QHP 5/>YRS: CPT | Performed by: ANESTHESIOLOGY

## 2020-09-08 PROCEDURE — 7100000011 HC PHASE II RECOVERY - ADDTL 15 MIN: Performed by: ANESTHESIOLOGY

## 2020-09-08 PROCEDURE — 3209999900 FLUORO FOR SURGICAL PROCEDURES

## 2020-09-08 PROCEDURE — 6360000002 HC RX W HCPCS: Performed by: ANESTHESIOLOGY

## 2020-09-08 PROCEDURE — 2580000003 HC RX 258: Performed by: ANESTHESIOLOGY

## 2020-09-08 PROCEDURE — 2500000003 HC RX 250 WO HCPCS: Performed by: ANESTHESIOLOGY

## 2020-09-08 PROCEDURE — 99153 MOD SED SAME PHYS/QHP EA: CPT | Performed by: ANESTHESIOLOGY

## 2020-09-08 PROCEDURE — 3600000012 HC SURGERY LEVEL 2 ADDTL 15MIN: Performed by: ANESTHESIOLOGY

## 2020-09-08 RX ORDER — LIDOCAINE HYDROCHLORIDE 10 MG/ML
INJECTION, SOLUTION EPIDURAL; INFILTRATION; INTRACAUDAL; PERINEURAL PRN
Status: DISCONTINUED | OUTPATIENT
Start: 2020-09-08 | End: 2020-09-08 | Stop reason: ALTCHOICE

## 2020-09-08 RX ORDER — FENTANYL CITRATE 50 UG/ML
INJECTION, SOLUTION INTRAMUSCULAR; INTRAVENOUS PRN
Status: DISCONTINUED | OUTPATIENT
Start: 2020-09-08 | End: 2020-09-08 | Stop reason: ALTCHOICE

## 2020-09-08 RX ORDER — METHYLPREDNISOLONE ACETATE 40 MG/ML
INJECTION, SUSPENSION INTRA-ARTICULAR; INTRALESIONAL; INTRAMUSCULAR; SOFT TISSUE PRN
Status: DISCONTINUED | OUTPATIENT
Start: 2020-09-08 | End: 2020-09-08 | Stop reason: ALTCHOICE

## 2020-09-08 RX ORDER — SODIUM CHLORIDE 0.9 % (FLUSH) 0.9 %
10 SYRINGE (ML) INJECTION PRN
Status: DISCONTINUED | OUTPATIENT
Start: 2020-09-08 | End: 2020-09-08 | Stop reason: HOSPADM

## 2020-09-08 RX ORDER — SODIUM CHLORIDE, SODIUM LACTATE, POTASSIUM CHLORIDE, CALCIUM CHLORIDE 600; 310; 30; 20 MG/100ML; MG/100ML; MG/100ML; MG/100ML
INJECTION, SOLUTION INTRAVENOUS CONTINUOUS
Status: DISCONTINUED | OUTPATIENT
Start: 2020-09-08 | End: 2020-09-08 | Stop reason: HOSPADM

## 2020-09-08 RX ORDER — BUPIVACAINE HYDROCHLORIDE 5 MG/ML
INJECTION, SOLUTION EPIDURAL; INTRACAUDAL PRN
Status: DISCONTINUED | OUTPATIENT
Start: 2020-09-08 | End: 2020-09-08 | Stop reason: ALTCHOICE

## 2020-09-08 RX ORDER — SODIUM CHLORIDE 0.9 % (FLUSH) 0.9 %
10 SYRINGE (ML) INJECTION EVERY 12 HOURS SCHEDULED
Status: DISCONTINUED | OUTPATIENT
Start: 2020-09-08 | End: 2020-09-08 | Stop reason: HOSPADM

## 2020-09-08 RX ORDER — MIDAZOLAM HYDROCHLORIDE 1 MG/ML
INJECTION INTRAMUSCULAR; INTRAVENOUS PRN
Status: DISCONTINUED | OUTPATIENT
Start: 2020-09-08 | End: 2020-09-08 | Stop reason: ALTCHOICE

## 2020-09-08 RX ADMIN — SODIUM CHLORIDE, POTASSIUM CHLORIDE, SODIUM LACTATE AND CALCIUM CHLORIDE: 600; 310; 30; 20 INJECTION, SOLUTION INTRAVENOUS at 14:10

## 2020-09-08 ASSESSMENT — PAIN - FUNCTIONAL ASSESSMENT: PAIN_FUNCTIONAL_ASSESSMENT: 0-10

## 2020-09-08 ASSESSMENT — PAIN DESCRIPTION - PAIN TYPE: TYPE: CHRONIC PAIN

## 2020-09-08 ASSESSMENT — PAIN SCALES - GENERAL: PAINLEVEL_OUTOF10: 4

## 2020-09-08 ASSESSMENT — PAIN DESCRIPTION - ORIENTATION: ORIENTATION: LEFT

## 2020-09-08 ASSESSMENT — PAIN DESCRIPTION - DESCRIPTORS: DESCRIPTORS: ACHING;STABBING

## 2020-09-08 ASSESSMENT — PAIN DESCRIPTION - LOCATION: LOCATION: BACK

## 2020-09-08 NOTE — OP NOTE
Procedure Note    Patient Name: Korina Silva   YOB: 1965  Room/Bed: Room/bed info not found  Medical Record Number: [id number]  Date: 9/8/2020         Mallampati Airway Assessment:  Mallampati Class II - (soft palate, fauces & uvula are visible)    ASA Classification:  Class 2 - A normal healthy patient with mild systemic disease      Preoperative Diagnosis:  Lumbar/Lumbosacral Spondylosis with chronic lower back pain. Postoperative Diagnosis:  Same as pre-op diagnosis. Procedure Performed:  Radiofrequency ablation of median branches at the levels of L3, L4, L5 left under fluoroscopic guidance with IV sedation. Indication for the Procedure: The patient has ahistory of chronic low back pain that is not responding well to the conservative treatment. Patient's pain is mostly axial in nature. Pain is interfering with the activities of daily living. Physical examination revealed facet tenderness and facet loading is positive. Patient had undergone lumbar facet joint injections with pain relief that lasted for only a short period of time and had greater than 80% pain relief during the diagnostic phase of the block. Hence we decided to do radiofrequency abalation of median branches for long term pain releif. The procedure and risks  were discussed with the patient and an informed consent was obtained. Procedure:  After starting an IV, the patient was sedated with 2 mg of Midazolam and 100 mcg of Fentanyl intravenously by the RN under my direct supervision. Patient's vital signs including BP, EKG and SaO2 were monitored by the RN and they remained stable during the procedure. A meaningful communication was kept up with the patient throughout the procedure. The patient is placed in prone position and skin over the back was prepped and draped in sterile manner.   Then using fluoroscopy the junction of the transverse process of the vertebra with the superior process of the facet joint was observed and the view was optimized. The skin and deep tissues posterior were infiltrated with 10 ml of  1% Lidocaine. The RF canula with the 10 mm active tip was introduced through the skin wheal under fluoroscopy guidance such that the tip of the needle lies in the groove of the transverse process with the superior processes of the facet joint. Then a lateral view of the lumbar spine was obtained to make sure the tip of needle is not in the neural foramen. Then electric impedence was checked to make sure it is acceptable. Then a sensory stimulus was applied at 50 Hz up to 1 volt and concordant pain symptoms were reproduced. Then a motor stimulus was applied at 2 Hz up to 2 volts and no motor stimulation was seen in lower extremities. Some multifidus stimulus was seen. Then after negative aspiration a mixture of Lidocaine 1% and 0.5%  Marcaine was injected through the needle. Then a initial lesion was done at 80 degrees centigrade for 90 seconds. Then the needle was repositioned such that it lies somewaht superior and medial to the origional position and a second lesion was applied. A total of 40 mg of depo-medrol and 2 ml 0.5% Marcaine was used. This was done at the levels of L3, L4, L5 in similar fashion. For L5 median branch block the junction of the ala of  the sacrum with the superior articular process of the facet joint was taken as a reference point. For the L4 median branch the junction of the transverse process of L5 with the superolateral possible facet joint was taken as a reference point. For L3 median branch the junction of L4 transverse process and superior articular process of facet joint was taken as reference point and so on. Patient's vital signs and neurological status remained stable throughout the procedure and post procedural period. The patient tolerated the procedure well and was discharged home in stable condition.      EBL: None

## 2020-09-08 NOTE — PROGRESS NOTES
Back injection site band aids clean and dry    Discharge Criteria    Inpatients must meet Criteria 1 through 7. All other patients are either YES or N/A. If a NO is chosen then Anesthesia or Surgeon must be notified. 1.  Minimum 30 minutes after last dose of sedative medication, minimum 120 minutes after last dose of reversal agent. Yes      2. Systolic BP stable within 20 mmHg for 30 minutes & systolic BP between 90 & 814 or within 10 mmHg of baseline. Yes      3. Pulse between 60 and 100 or within 10 bpm of baseline. Yes      4. Spontaneous respiratory rate >/= 10 per minute. Yes      5. SaO2 >/= 95 or  >/= baseline. Yes      6. Able to cough and swallow or return to baseline function. Yes      7. Alert and oriented or return to baseline mental status. Yes      8. Demonstrates controlled, coordinated movements, ambulates with steady gait, or return to baseline activity function. Yes      9. Minimal or no pain or nausea, or at a level tolerable and acceptable to patient. Yes      10. Takes and retains oral fluids as allowed. Yes      11. Procedural / perioperative site stable. Minimal or no bleeding. Yes          12. If GI endoscopy procedure, minimal or no abdominal distention or passing flatus. N/A      13. Written discharge instructions and emergency telephone number provided. Yes      14. Accompanied by a responsible adult.     Yes

## 2020-12-08 ENCOUNTER — APPOINTMENT (OUTPATIENT)
Dept: GENERAL RADIOLOGY | Age: 55
End: 2020-12-08
Attending: ANESTHESIOLOGY
Payer: OTHER GOVERNMENT

## 2020-12-08 ENCOUNTER — HOSPITAL ENCOUNTER (OUTPATIENT)
Age: 55
Setting detail: OUTPATIENT SURGERY
Discharge: HOME | End: 2020-12-08
Attending: ANESTHESIOLOGY | Admitting: ANESTHESIOLOGY
Payer: OTHER GOVERNMENT

## 2020-12-08 VITALS
HEIGHT: 68 IN | BODY MASS INDEX: 31.07 KG/M2 | SYSTOLIC BLOOD PRESSURE: 129 MMHG | OXYGEN SATURATION: 98 % | WEIGHT: 205 LBS | RESPIRATION RATE: 16 BRPM | DIASTOLIC BLOOD PRESSURE: 90 MMHG | TEMPERATURE: 98.8 F | HEART RATE: 78 BPM

## 2020-12-08 PROCEDURE — 3600000002 HC SURGERY LEVEL 2 BASE: Performed by: ANESTHESIOLOGY

## 2020-12-08 PROCEDURE — 2709999900 HC NON-CHARGEABLE SUPPLY: Performed by: ANESTHESIOLOGY

## 2020-12-08 PROCEDURE — 2500000003 HC RX 250 WO HCPCS: Performed by: ANESTHESIOLOGY

## 2020-12-08 PROCEDURE — 3209999900 FLUORO FOR SURGICAL PROCEDURES

## 2020-12-08 PROCEDURE — 6360000004 HC RX CONTRAST MEDICATION: Performed by: ANESTHESIOLOGY

## 2020-12-08 PROCEDURE — 6360000002 HC RX W HCPCS: Performed by: ANESTHESIOLOGY

## 2020-12-08 RX ORDER — DEXAMETHASONE SODIUM PHOSPHATE 4 MG/ML
INJECTION, SOLUTION INTRA-ARTICULAR; INTRALESIONAL; INTRAMUSCULAR; INTRAVENOUS; SOFT TISSUE PRN
Status: DISCONTINUED | OUTPATIENT
Start: 2020-12-08 | End: 2020-12-08 | Stop reason: ALTCHOICE

## 2020-12-08 RX ORDER — METHYLPREDNISOLONE ACETATE 40 MG/ML
INJECTION, SUSPENSION INTRA-ARTICULAR; INTRALESIONAL; INTRAMUSCULAR; SOFT TISSUE PRN
Status: DISCONTINUED | OUTPATIENT
Start: 2020-12-08 | End: 2020-12-08 | Stop reason: ALTCHOICE

## 2020-12-08 RX ORDER — BUPIVACAINE HYDROCHLORIDE 2.5 MG/ML
INJECTION, SOLUTION EPIDURAL; INFILTRATION; INTRACAUDAL PRN
Status: DISCONTINUED | OUTPATIENT
Start: 2020-12-08 | End: 2020-12-08 | Stop reason: ALTCHOICE

## 2020-12-08 RX ORDER — LIDOCAINE HYDROCHLORIDE 10 MG/ML
INJECTION, SOLUTION EPIDURAL; INFILTRATION; INTRACAUDAL; PERINEURAL PRN
Status: DISCONTINUED | OUTPATIENT
Start: 2020-12-08 | End: 2020-12-08 | Stop reason: ALTCHOICE

## 2020-12-08 RX ORDER — SODIUM CHLORIDE, SODIUM LACTATE, POTASSIUM CHLORIDE, CALCIUM CHLORIDE 600; 310; 30; 20 MG/100ML; MG/100ML; MG/100ML; MG/100ML
INJECTION, SOLUTION INTRAVENOUS CONTINUOUS
Status: DISCONTINUED | OUTPATIENT
Start: 2020-12-08 | End: 2020-12-08 | Stop reason: HOSPADM

## 2020-12-08 RX ORDER — SODIUM CHLORIDE 0.9 % (FLUSH) 0.9 %
10 SYRINGE (ML) INJECTION PRN
Status: DISCONTINUED | OUTPATIENT
Start: 2020-12-08 | End: 2020-12-08 | Stop reason: HOSPADM

## 2020-12-08 RX ORDER — SODIUM CHLORIDE 0.9 % (FLUSH) 0.9 %
10 SYRINGE (ML) INJECTION EVERY 12 HOURS SCHEDULED
Status: DISCONTINUED | OUTPATIENT
Start: 2020-12-08 | End: 2020-12-08 | Stop reason: HOSPADM

## 2020-12-08 ASSESSMENT — PAIN - FUNCTIONAL ASSESSMENT: PAIN_FUNCTIONAL_ASSESSMENT: 0-10

## 2020-12-08 NOTE — PROGRESS NOTES
1546 pt received in endo bay 2 after pain injection. Pt denies discomfort. Dr. Riana Michaels spoke with pt. Discharge instructions given to pt. 1223 Discharged ambulatory. Discharge Criteria    Inpatients must meet Criteria 1 through 7. All other patients are either YES or N/A. If a NO is chosen then Anesthesia or Surgeon must be notified. 1.  Minimum 30 minutes after last dose of sedative medication, minimum 120 minutes after last dose of reversal agent. N/A      2. Systolic BP stable within 20 mmHg for 30 minutes & systolic BP between 90 & 249 or within 10 mmHg of baseline. N/A      3.  Pulse between 60 and 100 or within 10 bpm of baseline. N/A      4.  Spontaneous respiratory rate >/= 10 per minute. Yes      5. SaO2 >/= 95 or  >/= baseline. N/A      6. Able to cough and swallow or return to baseline function. N/A      7. Alert and oriented or return to baseline mental status. Yes      8. Demonstrates controlled, coordinated movements, ambulates with steady gait, or return to baseline activity function. Yes      9. Minimal or no pain or nausea, or at a level tolerable and acceptable to patient. Yes      10. Takes and retains oral fluids as allowed. N/A      11. Procedural / perioperative site stable. Minimal or no bleeding. Yes          12. If GI endoscopy procedure, minimal or no abdominal distention or passing flatus. N/A      13. Written discharge instructions and emergency telephone number provided. Yes      14. Accompanied by a responsible adult.     Yes

## 2020-12-08 NOTE — OP NOTE
Procedure Note    Patient Name: Millie Aguero   YOB: 1965  Room/Bed: Room/bed info not found  Medical Record Number: [id number]  Date: 12/8/2020       Mallampati Airway Assessment:  Mallampati Class II - (soft palate, fauces & uvula are visible)    ASA Classification:  Class 2 - A normal healthy patient with mild systemic disease        Preoperative Diagnosis:  Lumbar degenerative disc disease with radiculitis. Postoperative diagnosis:  Same as pre-op diagnosis. Procedure Performed:  Transforaminal lumbar epidural steroid injection at the levels of L4 - 5 and L5 - S1 on the Left side under fluoroscopic guidance without IV sedation. Indication for the Procedure: The patient failed conservative management  for pain in the low back radiating to lower extremities. Patient  is undergoing lumbar epidural steroid injections today. As the patient is not responding to conservative management and pain is interfering with activities of daily living, we decided to proceed with transforaminal lumbar epidural steroid injection as symptoms are mostly following the nerve root distribution. The procedure and the risks were discussed with the patient and informed consent was obtained. Procedure: The patient's vital signs including BP, EKG and SaO2 were monitored by the RN and they remained stable during the procedure. A meaningful communication was kept up with the patient throughout   the procedure. The patient is placed in prone position. The skin over the back was prepped and draped in sterile manner. Then the skin and deep tissues just above the SAP of S1 vertebra on Left side were infiltrated with about 3 ml of 1% lidocaine.  The #23-gauge, 5 inch spinal needle with slightly curved tip was inserted through the skin wheal  and under fluoroscopy guidance was directed such that the tip of the needle lies in the neuroforamina at about the 6 o'clock position under the pedicle of the L-5 vertebra. This was confirmed with AP and lateral views of the fluoroscopy. Then after negative aspiration a total of 1 ml of Omnipaque-180 was injected through the needle and spread of the contrast in the epidural space as well as along the nerve root was observed without evidence of intrathecal or intravascular uptake. Then after negative aspiration a total of 2.5 mL of a solution consisting of 40 mg of Depo-Medrol, 4 of Dexamethasone, and 3 ml of 0.25% Bupivacaine preservative free was injected through the needle. The procedure was then repeated on the Left at L4 - 5 in a similar fashion. The needle was removed and a Band-Aid was placed over the needle  insertion site. The patient's vital signs remained stable and the patient tolerated the procedure well. The patient was discharged home in stable condition and will be followed in the pain clinic in the next few weeks for further planning.     EBL: None

## 2021-02-23 ENCOUNTER — APPOINTMENT (OUTPATIENT)
Dept: GENERAL RADIOLOGY | Age: 56
End: 2021-02-23
Attending: ANESTHESIOLOGY
Payer: OTHER GOVERNMENT

## 2021-02-23 ENCOUNTER — HOSPITAL ENCOUNTER (OUTPATIENT)
Age: 56
Setting detail: OUTPATIENT SURGERY
Discharge: HOME | End: 2021-02-23
Attending: ANESTHESIOLOGY | Admitting: ANESTHESIOLOGY
Payer: OTHER GOVERNMENT

## 2021-02-23 VITALS
BODY MASS INDEX: 32.58 KG/M2 | HEART RATE: 72 BPM | WEIGHT: 215 LBS | OXYGEN SATURATION: 97 % | RESPIRATION RATE: 16 BRPM | HEIGHT: 68 IN | TEMPERATURE: 98 F | DIASTOLIC BLOOD PRESSURE: 72 MMHG | SYSTOLIC BLOOD PRESSURE: 127 MMHG

## 2021-02-23 PROCEDURE — 2709999900 HC NON-CHARGEABLE SUPPLY: Performed by: ANESTHESIOLOGY

## 2021-02-23 PROCEDURE — 3600000002 HC SURGERY LEVEL 2 BASE: Performed by: ANESTHESIOLOGY

## 2021-02-23 PROCEDURE — 3209999900 FLUORO FOR SURGICAL PROCEDURES

## 2021-02-23 PROCEDURE — 6360000002 HC RX W HCPCS: Performed by: ANESTHESIOLOGY

## 2021-02-23 PROCEDURE — 6360000004 HC RX CONTRAST MEDICATION: Performed by: ANESTHESIOLOGY

## 2021-02-23 PROCEDURE — 2500000003 HC RX 250 WO HCPCS: Performed by: ANESTHESIOLOGY

## 2021-02-23 RX ORDER — DEXAMETHASONE SODIUM PHOSPHATE 4 MG/ML
INJECTION, SOLUTION INTRA-ARTICULAR; INTRALESIONAL; INTRAMUSCULAR; INTRAVENOUS; SOFT TISSUE PRN
Status: DISCONTINUED | OUTPATIENT
Start: 2021-02-23 | End: 2021-02-23 | Stop reason: ALTCHOICE

## 2021-02-23 RX ORDER — SODIUM CHLORIDE 0.9 % (FLUSH) 0.9 %
10 SYRINGE (ML) INJECTION PRN
Status: DISCONTINUED | OUTPATIENT
Start: 2021-02-23 | End: 2021-02-23 | Stop reason: HOSPADM

## 2021-02-23 RX ORDER — METHYLPREDNISOLONE ACETATE 40 MG/ML
INJECTION, SUSPENSION INTRA-ARTICULAR; INTRALESIONAL; INTRAMUSCULAR; SOFT TISSUE PRN
Status: DISCONTINUED | OUTPATIENT
Start: 2021-02-23 | End: 2021-02-23 | Stop reason: ALTCHOICE

## 2021-02-23 RX ORDER — LIDOCAINE HYDROCHLORIDE 10 MG/ML
INJECTION, SOLUTION EPIDURAL; INFILTRATION; INTRACAUDAL; PERINEURAL PRN
Status: DISCONTINUED | OUTPATIENT
Start: 2021-02-23 | End: 2021-02-23 | Stop reason: ALTCHOICE

## 2021-02-23 RX ORDER — BUPIVACAINE HYDROCHLORIDE 2.5 MG/ML
INJECTION, SOLUTION EPIDURAL; INFILTRATION; INTRACAUDAL PRN
Status: DISCONTINUED | OUTPATIENT
Start: 2021-02-23 | End: 2021-02-23 | Stop reason: ALTCHOICE

## 2021-02-23 RX ORDER — SODIUM CHLORIDE 0.9 % (FLUSH) 0.9 %
10 SYRINGE (ML) INJECTION EVERY 12 HOURS SCHEDULED
Status: DISCONTINUED | OUTPATIENT
Start: 2021-02-23 | End: 2021-02-23 | Stop reason: HOSPADM

## 2021-02-23 RX ORDER — SODIUM CHLORIDE, SODIUM LACTATE, POTASSIUM CHLORIDE, CALCIUM CHLORIDE 600; 310; 30; 20 MG/100ML; MG/100ML; MG/100ML; MG/100ML
INJECTION, SOLUTION INTRAVENOUS CONTINUOUS
Status: DISCONTINUED | OUTPATIENT
Start: 2021-02-23 | End: 2021-02-23 | Stop reason: HOSPADM

## 2021-02-23 NOTE — OP NOTE
Procedure Note    Patient Name: Doreen Weaver   YOB: 1965  Room/Bed: Room/bed info not found  Medical Record Number: [id number]  Date: 2/23/2021       Mallampati Airway Assessment:  Mallampati Class II - (soft palate, fauces & uvula are visible)     ASA Classification:  Class 2 - A normal healthy patient with mild systemic disease           Preoperative Diagnosis:  Lumbar degenerative disc disease with radiculitis.       Postoperative diagnosis:  Same as pre-op diagnosis.       Procedure Performed:  Transforaminal lumbar epidural steroid injection at the levels of L4 - 5 and L5 - S1 on the Left side under fluoroscopic guidance without IV sedation.     Indication for the Procedure: The patient failed conservative management  for pain in the low back radiating to lower extremities. Patient  is undergoing lumbar epidural steroid injections today. As the patient is not responding to conservative management and pain is interfering with activities of daily living, we decided to proceed with transforaminal lumbar epidural steroid injection as symptoms are mostly following the nerve root distribution. The procedure and the risks were discussed with the patient and informed consent was obtained.      Procedure: The patient's vital signs including BP, EKG and SaO2 were monitored by the RN and they remained stable during the procedure.   A meaningful communication was kept up with the patient throughout   the procedure.    The patient is placed in prone position. The skin over the back was prepped and draped in sterile manner. Then the skin and deep tissues just above the SAP of S1 vertebra on Left side were infiltrated with about 3 ml of 1% lidocaine. The #23-gauge, 5 inch spinal needle with slightly curved tip was inserted through the skin wheal  and under fluoroscopy guidance was directed such that the tip of the needle lies in the neuroforamina at about the 6 o'clock position under the pedicle of the L-5 vertebra. This was confirmed with AP and lateral views of the fluoroscopy. Then after negative aspiration a total of 1 ml of Omnipaque-180 was injected through the needle and spread of the contrast in the epidural space as well as along the nerve root was observed without evidence of intrathecal or intravascular uptake. Then after negative aspiration a total of 2.5 mL of a solution consisting of 40 mg of Depo-Medrol, 4 of Dexamethasone, and 3 ml of 0.25% Bupivacaine preservative free was injected through the needle. The procedure was then repeated on the Left at L4 - 5 in a similar fashion. The needle was removed and a Band-Aid was placed over the needle  insertion site.     The patient's vital signs remained stable and the patient tolerated the procedure well.   The patient was discharged home in stable condition and will be followed in the pain clinic in the next few weeks for further planning.     EBL: None

## 2021-04-13 ENCOUNTER — APPOINTMENT (OUTPATIENT)
Dept: GENERAL RADIOLOGY | Age: 56
End: 2021-04-13
Attending: ANESTHESIOLOGY
Payer: OTHER GOVERNMENT

## 2021-04-13 ENCOUNTER — HOSPITAL ENCOUNTER (OUTPATIENT)
Age: 56
Setting detail: OUTPATIENT SURGERY
Discharge: HOME | End: 2021-04-13
Attending: ANESTHESIOLOGY | Admitting: ANESTHESIOLOGY
Payer: OTHER GOVERNMENT

## 2021-04-13 VITALS
BODY MASS INDEX: 31.07 KG/M2 | RESPIRATION RATE: 19 BRPM | DIASTOLIC BLOOD PRESSURE: 72 MMHG | OXYGEN SATURATION: 97 % | WEIGHT: 205 LBS | TEMPERATURE: 99.2 F | HEIGHT: 68 IN | SYSTOLIC BLOOD PRESSURE: 115 MMHG

## 2021-04-13 PROCEDURE — 2709999900 HC NON-CHARGEABLE SUPPLY: Performed by: ANESTHESIOLOGY

## 2021-04-13 PROCEDURE — 3600000002 HC SURGERY LEVEL 2 BASE: Performed by: ANESTHESIOLOGY

## 2021-04-13 PROCEDURE — 6360000004 HC RX CONTRAST MEDICATION: Performed by: ANESTHESIOLOGY

## 2021-04-13 PROCEDURE — 6360000002 HC RX W HCPCS: Performed by: ANESTHESIOLOGY

## 2021-04-13 PROCEDURE — 2500000003 HC RX 250 WO HCPCS: Performed by: ANESTHESIOLOGY

## 2021-04-13 PROCEDURE — 2580000003 HC RX 258: Performed by: ANESTHESIOLOGY

## 2021-04-13 PROCEDURE — 3209999900 FLUORO FOR SURGICAL PROCEDURES

## 2021-04-13 RX ORDER — SODIUM CHLORIDE 9 MG/ML
25 INJECTION, SOLUTION INTRAVENOUS PRN
Status: DISCONTINUED | OUTPATIENT
Start: 2021-04-13 | End: 2021-04-13 | Stop reason: HOSPADM

## 2021-04-13 RX ORDER — SODIUM CHLORIDE 0.9 % (FLUSH) 0.9 %
5-40 SYRINGE (ML) INJECTION EVERY 12 HOURS SCHEDULED
Status: DISCONTINUED | OUTPATIENT
Start: 2021-04-13 | End: 2021-04-13 | Stop reason: HOSPADM

## 2021-04-13 RX ORDER — SODIUM CHLORIDE 9 MG/ML
INJECTION INTRAVENOUS PRN
Status: DISCONTINUED | OUTPATIENT
Start: 2021-04-13 | End: 2021-04-13 | Stop reason: ALTCHOICE

## 2021-04-13 RX ORDER — SODIUM CHLORIDE 0.9 % (FLUSH) 0.9 %
5-40 SYRINGE (ML) INJECTION PRN
Status: DISCONTINUED | OUTPATIENT
Start: 2021-04-13 | End: 2021-04-13 | Stop reason: HOSPADM

## 2021-04-13 RX ORDER — DEXAMETHASONE SODIUM PHOSPHATE 4 MG/ML
INJECTION, SOLUTION INTRA-ARTICULAR; INTRALESIONAL; INTRAMUSCULAR; INTRAVENOUS; SOFT TISSUE PRN
Status: DISCONTINUED | OUTPATIENT
Start: 2021-04-13 | End: 2021-04-13 | Stop reason: ALTCHOICE

## 2021-04-13 RX ORDER — LIDOCAINE HYDROCHLORIDE 10 MG/ML
INJECTION, SOLUTION EPIDURAL; INFILTRATION; INTRACAUDAL; PERINEURAL PRN
Status: DISCONTINUED | OUTPATIENT
Start: 2021-04-13 | End: 2021-04-13 | Stop reason: ALTCHOICE

## 2021-04-13 RX ORDER — SODIUM CHLORIDE, SODIUM LACTATE, POTASSIUM CHLORIDE, CALCIUM CHLORIDE 600; 310; 30; 20 MG/100ML; MG/100ML; MG/100ML; MG/100ML
INJECTION, SOLUTION INTRAVENOUS CONTINUOUS
Status: DISCONTINUED | OUTPATIENT
Start: 2021-04-13 | End: 2021-04-13 | Stop reason: HOSPADM

## 2021-04-13 ASSESSMENT — PAIN DESCRIPTION - DESCRIPTORS: DESCRIPTORS: ACHING;CONSTANT;STABBING

## 2021-04-13 NOTE — PROGRESS NOTES
Discharge instructions given to patient with understanding voiced. No questions asked at this time. Released per ambulatory from surgery department. Alert and oriented x3. Skin warm, dry and pink. Resp easy and unlabored.

## 2021-04-13 NOTE — OP NOTE
Procedure Note    Patient Name: Winter Pardo   YOB: 1965  Room/Bed: Room/bed info not found  Medical Record Number: [id number]  Date: 4/13/2021       Mallampati Airway Assessment:  Mallampati Class II - (soft palate, fauces & uvula are visible)    ASA Classification:  Class 2 - A normal healthy patient with mild systemic disease      Preoperative Diagnosis:  Lumbar degenerative disc disease or disc protrusion with radicular pain. Postoperative Diagnosis:  Same as pre-op diagnosis. Procedure Performed:  Lumbar epidural steroid injection at L4-L5 under fluoroscopy guidance without IV sedation. Indication for the Procedure: The patient failed conservative management  for pain in the low back radiating to lower extremities. The patient is undergoing lumbar epidural steroid injection. As the patient is not responding to conservative management and it is interfering with activities of daily living we decided to proceed with lumbar epidural steroid injection. The procedure and risks were discussed with the patient and an informed consent was obtained    Procedure:  Patient's vital signs including BP, EKG and SaO2 were monitored by the RN and they remained stable during the procedure. A meaningful communication was kept up with the patient throughout  the procedure. The patient is placed in prone position. Skin over the back was prepped and draped in sterile manner. Then using fluoroscopy the L4-L5 interspace was observed and the skin and deep tissues in the midline were infiltrated with 5 ml of 1% lidocaine. The #20-gauge, 3-1/2 inch Tuohy needle was inserted through the skin wheal and the epidural space was identified using loss of resistance technique with normal saline. This was confirmed with AP and lateral views using fluoroscopy after injecting about 1 ml of Omnipaque-180 and observing the spread of the contrast in the epidural space.  Then after negative aspiration a total of 40 mg of

## 2021-09-09 ENCOUNTER — OFFICE VISIT (OUTPATIENT)
Dept: OBGYN | Age: 56
End: 2021-09-09
Payer: OTHER GOVERNMENT

## 2021-09-09 ENCOUNTER — HOSPITAL ENCOUNTER (OUTPATIENT)
Age: 56
Setting detail: SPECIMEN
Discharge: HOME | End: 2021-09-09
Payer: OTHER GOVERNMENT

## 2021-09-09 VITALS
BODY MASS INDEX: 34.1 KG/M2 | SYSTOLIC BLOOD PRESSURE: 122 MMHG | WEIGHT: 225 LBS | HEIGHT: 68 IN | DIASTOLIC BLOOD PRESSURE: 74 MMHG

## 2021-09-09 DIAGNOSIS — N93.0 POSTCOITAL BLEEDING: ICD-10-CM

## 2021-09-09 DIAGNOSIS — Z78.0 MENOPAUSE: ICD-10-CM

## 2021-09-09 DIAGNOSIS — Z12.31 SCREENING MAMMOGRAM, ENCOUNTER FOR: ICD-10-CM

## 2021-09-09 DIAGNOSIS — Z01.419 ENCOUNTER FOR ANNUAL ROUTINE GYNECOLOGICAL EXAMINATION: ICD-10-CM

## 2021-09-09 DIAGNOSIS — Z12.72 SCREENING FOR VAGINAL CANCER: Primary | ICD-10-CM

## 2021-09-09 PROCEDURE — 87070 CULTURE OTHR SPECIMN AEROBIC: CPT

## 2021-09-09 PROCEDURE — 99396 PREV VISIT EST AGE 40-64: CPT | Performed by: ADVANCED PRACTICE MIDWIFE

## 2021-09-09 RX ORDER — ESTRADIOL 1 MG/1
1 TABLET ORAL DAILY
Qty: 90 TABLET | Refills: 3 | Status: SHIPPED | OUTPATIENT
Start: 2021-09-09 | End: 2022-08-05

## 2021-09-09 RX ORDER — GABAPENTIN 400 MG/1
CAPSULE ORAL
COMMUNITY
Start: 2021-08-17

## 2021-09-09 ASSESSMENT — PATIENT HEALTH QUESTIONNAIRE - PHQ9
SUM OF ALL RESPONSES TO PHQ QUESTIONS 1-9: 0
SUM OF ALL RESPONSES TO PHQ9 QUESTIONS 1 & 2: 0
SUM OF ALL RESPONSES TO PHQ QUESTIONS 1-9: 0
SUM OF ALL RESPONSES TO PHQ QUESTIONS 1-9: 0
2. FEELING DOWN, DEPRESSED OR HOPELESS: 0
1. LITTLE INTEREST OR PLEASURE IN DOING THINGS: 0

## 2021-09-09 ASSESSMENT — ENCOUNTER SYMPTOMS
ABDOMINAL PAIN: 0
BACK PAIN: 0
SHORTNESS OF BREATH: 0

## 2021-09-09 NOTE — PROGRESS NOTES
gen                                                                  YEARLY PHYSICAL    Date of service: 2021    Tawanda Reynoso  Is a 55 y.o.   female    PT's PCP is: Fahad Sheffield DO     : 1965                                             Subjective:       No LMP recorded. Patient has had a hysterectomy. Are your menses regular: not applicable    OB History    Para Term  AB Living   0 0 0 0 0 0   SAB TAB Ectopic Molar Multiple Live Births   0 0 0 0 0 0        Social History     Tobacco Use   Smoking Status Former Smoker    Quit date: 2012    Years since quittin.1   Smokeless Tobacco Never Used        Social History     Substance and Sexual Activity   Alcohol Use Yes    Comment: rare       Family History   Problem Relation Age of Onset    Stroke Mother         59years old   Abeba Almaguer Stroke Father         mid 74s    Other Other         Mat. and pat. aunt breast cancer. N family h/o DVT. Any family history of breast or ovarian cancer: Yes. Mat.  And Akirajani Davis. aunt  Any family history of blood clots: Yes, brother    Have you had a positive covid test: No    Have you had the covid immunization: Yes      Allergies: Dilaudid [hydromorphone hcl], Percocet [oxycodone-acetaminophen], and Amoxicillin-pot clavulanate      Current Outpatient Medications:     gabapentin (NEURONTIN) 400 MG capsule, TAKE 1 CAPSULE BY MOUTH THREE TIMES DAILY, Disp: , Rfl:     estradiol (ESTRACE) 1 MG tablet, Take 1 tablet by mouth daily, Disp: 90 tablet, Rfl: 3    estradiol (ESTRACE) 1 MG tablet, TAKE 1 TABLET DAILY, Disp: 90 tablet, Rfl: 0    TRINTELLIX 20 MG TABS tablet, 20 mg daily , Disp: , Rfl:     ALPRAZolam (XANAX) 0.25 MG tablet, , Disp: , Rfl:     omeprazole (PRILOSEC) 20 MG delayed release capsule, Take 20 mg by mouth daily, Disp: , Rfl:     traZODone (DESYREL) 50 MG tablet, Take 50 mg by mouth as needed for Sleep, Disp: , Rfl:     montelukast (SINGULAIR) 10 MG tablet, Take 10 mg by mouth as needed, Disp: , Rfl:     Albuterol Sulfate (PROAIR HFA IN), Inhale into the lungs as needed, Disp: , Rfl:     buPROPion (WELLBUTRIN XL) 300 MG XL tablet, Take 450 mg by mouth every morning., Disp: , Rfl:     multivitamin (THERAGRAN) per tablet, Take 1 tablet by mouth daily. , Disp: , Rfl:     fluticasone (FLONASE) 50 MCG/ACT nasal spray, USE 1 SPRAY(S) IN EACH NOSTRIL TWICE DAILY FOR 30 DAYS (Patient not taking: Reported on 9/9/2021), Disp: , Rfl:     Social History     Substance and Sexual Activity   Sexual Activity Yes    Partners: Male       Any bleeding or pain with intercourse: Yes    Last Yearly: 2020    Last pap: 2018    Last HPV: 2014    Last Mammogram: 07/07/2020    Last Dexascan 2019    Last colorectal screen- type:colonoscopy*  date  2019    Do you do self breast exams: No    Past Medical History:   Diagnosis Date    Depression     GERD (gastroesophageal reflux disease)     H/O cardiovascular stress test 12/13/2017    Significant electrocardiographic evidence of myocardial ischemia during EKG monitoring without significant associated arrhymias. The pt's Duke Treadmill score is 3.5 which correlates with an intermediate risk signifcant CAD.     H/O cardiovascular stress test 12/26/2017    Equivocal myocardial perfusion study. There is a small perfusion defect of mild intensity in the anterior and anteroapical regions during stress and rest imaging, which is most consistent with artifact but may be due to a small degree of coronary ischemia. EF was 69%. The pt's Duke Treadmill score is 0, which correlates with an intermediate risk for CAD.         Past Surgical History:   Procedure Laterality Date    CHOLECYSTECTOMY      DILATION AND CURETTAGE OF UTERUS      FOOT SURGERY      bilateral    HYSTERECTOMY      KNEE ARTHROTOMY      OVARIAN CYST REMOVAL      bilateral    OVARY REMOVAL      bilateral    PAIN MANAGEMENT PROCEDURE Left 7/14/2020    LUMBAR FACET-L4-5, L5-SI performed by Wallace Ceja Mila Lim MD at [address] [place] [address] Left 8/11/2020    LUMBAR MB / L5 DR B- L3,L4,L5 performed by Karlos Dickens MD at [address] [place] [address] Left 9/8/2020    NERVE RADIOFREQUENCY ABLATION- L3,L4,L5 performed by Karlos Dickens MD at [address] [place] [address] Left 12/8/2020    LUMBAR TRANSFORAMINAL L4-L5, performed by Karlos Dickens MD at [address] [place] [address] Left 2/23/2021    LUMBAR TRANSFORAMINAL, L4-L5, L5-S1 performed by Karlos Dickens MD at [address] [place] [address] N/A 4/13/2021    EPIDURAL STEROID INJECTION-LUMBAR L4-L5  LEFT OF MIDLINE performed by Karlos Dickens MD at [address] [place] [address]         Family History   Problem Relation Age of Onset    Stroke Mother         59years old   [facility] Stroke Father         mid 76s    Other Other         Mat. and pat. aunt breast cancer. N family h/o DVT. Chief Complaint   Patient presents with    Gynecologic Exam     Yearly exam. Last pap 12/2018 negative, HPV 2014 not detected. C/O discomfort , slight bleeding, with intercourse, felt like \"something was tearing\". PE:  Vital Signs  Blood pressure 122/74, height 5' 8\" (1.727 m), weight 225 lb (102.1 kg), not currently breastfeeding. Estimated body mass index is 34.21 kg/m² as calculated from the following:    Height as of this encounter: 5' 8\" (1.727 m). Weight as of this encounter: 225 lb (102.1 kg). Labs:    No results found for this visit on 09/09/21. PHQ-9 Total Score: 0 (9/9/2021 10:33 AM)      NURSE: Perri MARIA    HPI: here for annual gyn exam, c/o spotting after sex    Review of Systems   Constitutional: Negative. HENT: Negative for congestion. Respiratory: Negative for shortness of breath. Cardiovascular: Negative for chest pain. Gastrointestinal: Negative for abdominal pain. Genitourinary: Positive for vaginal bleeding. Negative for dysuria. Musculoskeletal: Negative for back pain. Skin: Negative for rash. Neurological: Negative for headaches. Psychiatric/Behavioral: The patient is not nervous/anxious. Objective  Lymphatic:   no lymphadenopathy  Heent:   negative   Cor: regular rate and rhythm, no murmurs              Pul:clear to auscultation bilaterally- no wheezes, rales or rhonchi, normal air movement, no respiratory distress      GI: Abdomen soft, non-tender. BS normal. No masses,  No organomegaly           Extremities: normal strength, tone, and muscle mass   Breasts: Breast:normal appearance, no masses or tenderness   Pelvic Exam: GENITAL/URINARY:  External Genitalia:  General appearance; normal, Hair distribution; normal, Lesions absent  Urethral Meatus:  Size normal, Location normal, Lesions absent, Prolapse absent  Urethra: Fullness absent, Masses absent  Bladder:  Fullness absent, Masses absent, Tenderness absent, Cystocele absent  Vagina:  General appearance normal, Estrogen effect normal, Discharge thin white no odor, Lesions absent, Pelvic support normal, hyperglandular tissue at 6 oclock introitus approx level of hymenal ring    Adenexa: Masses absent, Tenderness absent  Anus/Perineum:  Lesions absent and Masses absent                                                          Assessment and Plan          Diagnosis Orders   1. Screening for vaginal cancer  PAP SMEAR   2. Screening mammogram, encounter for  [facility] [place]EN DIGITAL SCREEN BILATERAL   3. Menopause  estradiol (ESTRACE) 1 MG tablet   4. Postcoital bleeding  Culture, Genital   5. Encounter for annual routine gynecological examination               I am having Eusebia Mace start on estradiol. I am also having her maintain her buPROPion, multivitamin, omeprazole, traZODone, montelukast, Albuterol Sulfate (PROAIR HFA IN), Trintellix, ALPRAZolam, fluticasone, estradiol, and gabapentin. Return in about 1 year (around 9/9/2022) for yearly.     She was also counseled on her preventative health maintenance recommendations and follow-up. There are no Patient Instructions on file for this visit.     ELDER Mnédez CNM,9/9/2021 9:44 PM

## 2021-09-12 LAB
CULTURE: NORMAL
Lab: NORMAL
SPECIMEN DESCRIPTION: NORMAL

## 2021-09-14 ENCOUNTER — HOSPITAL ENCOUNTER (OUTPATIENT)
Dept: LAB | Age: 56
Setting detail: SPECIMEN
Discharge: HOME | End: 2021-09-14
Payer: OTHER GOVERNMENT

## 2021-09-14 LAB — GYNECOLOGY CYTOLOGY REPORT: NORMAL

## 2021-09-14 PROCEDURE — C9803 HOPD COVID-19 SPEC COLLECT: HCPCS

## 2021-09-14 PROCEDURE — U0005 INFEC AGEN DETEC AMPLI PROBE: HCPCS

## 2021-09-14 PROCEDURE — U0003 INFECTIOUS AGENT DETECTION BY NUCLEIC ACID (DNA OR RNA); SEVERE ACUTE RESPIRATORY SYNDROME CORONAVIRUS 2 (SARS-COV-2) (CORONAVIRUS DISEASE [COVID-19]), AMPLIFIED PROBE TECHNIQUE, MAKING USE OF HIGH THROUGHPUT TECHNOLOGIES AS DESCRIBED BY CMS-2020-01-R: HCPCS

## 2021-09-15 LAB
SARS-COV-2: NORMAL
SARS-COV-2: NOT DETECTED
SOURCE: NORMAL

## 2021-10-29 ENCOUNTER — HOSPITAL ENCOUNTER (OUTPATIENT)
Dept: GENERAL RADIOLOGY | Age: 56
Discharge: HOME | End: 2021-10-31
Payer: OTHER GOVERNMENT

## 2021-10-29 ENCOUNTER — HOSPITAL ENCOUNTER (OUTPATIENT)
Age: 56
Discharge: HOME | End: 2021-10-31
Payer: OTHER GOVERNMENT

## 2021-10-29 DIAGNOSIS — M79.645 THUMB PAIN, LEFT: ICD-10-CM

## 2021-10-29 PROCEDURE — 73130 X-RAY EXAM OF HAND: CPT

## 2022-01-13 ENCOUNTER — HOSPITAL ENCOUNTER (OUTPATIENT)
Dept: WOMENS IMAGING | Age: 57
Discharge: HOME | End: 2022-01-15
Payer: OTHER GOVERNMENT

## 2022-01-13 DIAGNOSIS — Z12.31 SCREENING MAMMOGRAM, ENCOUNTER FOR: ICD-10-CM

## 2022-01-13 PROCEDURE — 77063 BREAST TOMOSYNTHESIS BI: CPT

## 2022-02-09 ENCOUNTER — HOSPITAL ENCOUNTER (OUTPATIENT)
Age: 57
Discharge: HOME | End: 2022-02-09
Payer: OTHER GOVERNMENT

## 2022-02-09 LAB
ABSOLUTE EOS #: 0.05 K/UL (ref 0–0.44)
ABSOLUTE IMMATURE GRANULOCYTE: <0.03 K/UL (ref 0–0.3)
ABSOLUTE LYMPH #: 1.89 K/UL (ref 1.1–3.7)
ABSOLUTE MONO #: 0.58 K/UL (ref 0.1–1.2)
ANION GAP SERPL CALCULATED.3IONS-SCNC: 10 MMOL/L (ref 9–17)
BASOPHILS # BLD: 1 % (ref 0–2)
BASOPHILS ABSOLUTE: 0.05 K/UL (ref 0–0.2)
BUN BLDV-MCNC: 14 MG/DL (ref 6–20)
BUN/CREAT BLD: 18 (ref 9–20)
CALCIUM SERPL-MCNC: 9.5 MG/DL (ref 8.6–10.4)
CHLORIDE BLD-SCNC: 103 MMOL/L (ref 98–107)
CHOLESTEROL/HDL RATIO: 2.8
CHOLESTEROL: 198 MG/DL
CO2: 26 MMOL/L (ref 20–31)
CREAT SERPL-MCNC: 0.77 MG/DL (ref 0.5–0.9)
DIFFERENTIAL TYPE: NORMAL
EOSINOPHILS RELATIVE PERCENT: 1 % (ref 1–4)
ESTIMATED AVERAGE GLUCOSE: 105 MG/DL
GFR AFRICAN AMERICAN: >60 ML/MIN
GFR NON-AFRICAN AMERICAN: >60 ML/MIN
GFR SERPL CREATININE-BSD FRML MDRD: NORMAL ML/MIN/{1.73_M2}
GFR SERPL CREATININE-BSD FRML MDRD: NORMAL ML/MIN/{1.73_M2}
GLUCOSE BLD-MCNC: 90 MG/DL (ref 70–99)
HBA1C MFR BLD: 5.3 % (ref 4–6)
HCT VFR BLD CALC: 39.5 % (ref 36.3–47.1)
HDLC SERPL-MCNC: 70 MG/DL
HEMOGLOBIN: 12.9 G/DL (ref 11.9–15.1)
IMMATURE GRANULOCYTES: 0 %
LDL CHOLESTEROL: 115 MG/DL (ref 0–130)
LYMPHOCYTES # BLD: 28 % (ref 24–43)
MCH RBC QN AUTO: 28.5 PG (ref 25.2–33.5)
MCHC RBC AUTO-ENTMCNC: 32.7 G/DL (ref 28.4–34.8)
MCV RBC AUTO: 87.4 FL (ref 82.6–102.9)
MONOCYTES # BLD: 9 % (ref 3–12)
NRBC AUTOMATED: 0 PER 100 WBC
PDW BLD-RTO: 12 % (ref 11.8–14.4)
PLATELET # BLD: 241 K/UL (ref 138–453)
PLATELET ESTIMATE: NORMAL
PMV BLD AUTO: 9.6 FL (ref 8.1–13.5)
POTASSIUM SERPL-SCNC: 4.5 MMOL/L (ref 3.7–5.3)
RBC # BLD: 4.52 M/UL (ref 3.95–5.11)
RBC # BLD: NORMAL 10*6/UL
SEG NEUTROPHILS: 61 % (ref 36–65)
SEGMENTED NEUTROPHILS ABSOLUTE COUNT: 4.26 K/UL (ref 1.5–8.1)
SODIUM BLD-SCNC: 139 MMOL/L (ref 135–144)
TRIGL SERPL-MCNC: 63 MG/DL
TSH SERPL DL<=0.05 MIU/L-ACNC: 2.96 MIU/L (ref 0.3–5)
VLDLC SERPL CALC-MCNC: NORMAL MG/DL (ref 1–30)
WBC # BLD: 6.9 K/UL (ref 3.5–11.3)
WBC # BLD: NORMAL 10*3/UL

## 2022-02-09 PROCEDURE — 36415 COLL VENOUS BLD VENIPUNCTURE: CPT

## 2022-02-09 PROCEDURE — 84443 ASSAY THYROID STIM HORMONE: CPT

## 2022-02-09 PROCEDURE — 80048 BASIC METABOLIC PNL TOTAL CA: CPT

## 2022-02-09 PROCEDURE — 85025 COMPLETE CBC W/AUTO DIFF WBC: CPT

## 2022-02-09 PROCEDURE — 83036 HEMOGLOBIN GLYCOSYLATED A1C: CPT

## 2022-02-09 PROCEDURE — 80061 LIPID PANEL: CPT

## 2022-08-04 DIAGNOSIS — Z78.0 MENOPAUSE: ICD-10-CM

## 2022-08-05 RX ORDER — ESTRADIOL 1 MG/1
TABLET ORAL
Qty: 90 TABLET | Refills: 0 | Status: SHIPPED | OUTPATIENT
Start: 2022-08-05 | End: 2022-09-14 | Stop reason: SDUPTHER

## 2022-09-14 ENCOUNTER — OFFICE VISIT (OUTPATIENT)
Dept: OBGYN | Age: 57
End: 2022-09-14
Payer: OTHER GOVERNMENT

## 2022-09-14 VITALS
DIASTOLIC BLOOD PRESSURE: 80 MMHG | HEIGHT: 68 IN | WEIGHT: 242 LBS | BODY MASS INDEX: 36.68 KG/M2 | SYSTOLIC BLOOD PRESSURE: 132 MMHG

## 2022-09-14 DIAGNOSIS — Z78.0 MENOPAUSE: ICD-10-CM

## 2022-09-14 DIAGNOSIS — Z13.820 SCREENING FOR OSTEOPOROSIS: ICD-10-CM

## 2022-09-14 DIAGNOSIS — Z12.31 SCREENING MAMMOGRAM, ENCOUNTER FOR: ICD-10-CM

## 2022-09-14 DIAGNOSIS — Z01.419 ENCOUNTER FOR ANNUAL ROUTINE GYNECOLOGICAL EXAMINATION: Primary | ICD-10-CM

## 2022-09-14 PROCEDURE — 99396 PREV VISIT EST AGE 40-64: CPT | Performed by: ADVANCED PRACTICE MIDWIFE

## 2022-09-14 RX ORDER — ASPIRIN 81 MG/1
81 TABLET ORAL DAILY
COMMUNITY

## 2022-09-14 RX ORDER — ESTRADIOL 1 MG/1
1 TABLET ORAL DAILY
Qty: 90 TABLET | Refills: 3 | Status: SHIPPED | OUTPATIENT
Start: 2022-09-14

## 2022-09-14 ASSESSMENT — ENCOUNTER SYMPTOMS
SHORTNESS OF BREATH: 0
BACK PAIN: 0
ABDOMINAL PAIN: 0

## 2022-09-14 NOTE — PROGRESS NOTES
YEARLY PHYSICAL    Date of service: 2022    Rolene Lipoma  Is a 65 y.o.   female    PT's PCP is: Samantha Mccabe DO     : 1965                                             Subjective:       No LMP recorded. Patient has had a hysterectomy. Are your menses regular: not applicable    OB History    Para Term  AB Living   0 0 0 0 0 0   SAB IAB Ectopic Molar Multiple Live Births   0 0 0 0 0 0        Social History     Tobacco Use   Smoking Status Former    Types: Cigarettes    Quit date: 2012    Years since quitting: 10.2   Smokeless Tobacco Never        Social History     Substance and Sexual Activity   Alcohol Use Yes    Comment: rare       Family History   Problem Relation Age of Onset    Stroke Mother         59years old    Stroke Father         mid 76s    Other Other         Mat. and pat. aunt breast cancer. N family h/o DVT. Any family history of breast or ovarian cancer: Yes, breast mat.  And pat aunt    Any family history of blood clots: Yes, brother    Have you had a positive covid test: Yes    Have you had the covid immunization: Yes      Allergies: Ceftin [cefuroxime], Dilaudid [hydromorphone hcl], Percocet [oxycodone-acetaminophen], and Amoxicillin-pot clavulanate      Current Outpatient Medications:     aspirin 81 MG EC tablet, Take 81 mg by mouth daily, Disp: , Rfl:     estradiol (ESTRACE) 1 MG tablet, TAKE 1 TABLET DAILY, Disp: 90 tablet, Rfl: 0    gabapentin (NEURONTIN) 400 MG capsule, TAKE 1 CAPSULE BY MOUTH THREE TIMES DAILY, Disp: , Rfl:     estradiol (ESTRACE) 1 MG tablet, TAKE 1 TABLET DAILY, Disp: 90 tablet, Rfl: 0    fluticasone (FLONASE) 50 MCG/ACT nasal spray, USE 1 SPRAY(S) IN EACH NOSTRIL TWICE DAILY FOR 30 DAYS, Disp: , Rfl:     TRINTELLIX 20 MG TABS tablet, 20 mg daily , Disp: , Rfl:     ALPRAZolam (XANAX) 0.25 MG tablet, , Disp: , Rfl:     omeprazole (PRILOSEC) 20 MG delayed release capsule, Take 20 mg by mouth daily, Disp: , Rfl:     traZODone (DESYREL) 50 MG tablet, Take 50 mg by mouth as needed for Sleep, Disp: , Rfl:     montelukast (SINGULAIR) 10 MG tablet, Take 10 mg by mouth as needed, Disp: , Rfl:     Albuterol Sulfate (PROAIR HFA IN), Inhale into the lungs as needed, Disp: , Rfl:     buPROPion (WELLBUTRIN XL) 300 MG XL tablet, Take 450 mg by mouth every morning., Disp: , Rfl:     multivitamin (THERAGRAN) per tablet, Take 1 tablet by mouth daily. , Disp: , Rfl:     Social History     Substance and Sexual Activity   Sexual Activity Yes    Partners: Male       Any bleeding or pain with intercourse: No    Last Yearly:  09/09/2021    Last pap: 09/09/2021    Last HPV: 2014    Last Mammogram: 01/2022    Last Dexascan 2019    Last colorectal screen- type:colonoscopy*  date  2019    Do you do self breast exams: Yes    Past Medical History:   Diagnosis Date    Depression     GERD (gastroesophageal reflux disease)     H/O cardiovascular stress test 12/13/2017    Significant electrocardiographic evidence of myocardial ischemia during EKG monitoring without significant associated arrhymias. The pt's Duke Treadmill score is 3.5 which correlates with an intermediate risk signifcant CAD. H/O cardiovascular stress test 12/26/2017    Equivocal myocardial perfusion study. There is a small perfusion defect of mild intensity in the anterior and anteroapical regions during stress and rest imaging, which is most consistent with artifact but may be due to a small degree of coronary ischemia. EF was 69%. The pt's Duke Treadmill score is 0, which correlates with an intermediate risk for CAD.         Past Surgical History:   Procedure Laterality Date    CHOLECYSTECTOMY      DILATION AND CURETTAGE OF UTERUS      FOOT SURGERY      bilateral    HYSTERECTOMY (CERVIX STATUS UNKNOWN)      KNEE ARTHROTOMY      OVARIAN CYST REMOVAL      bilateral    OVARY REMOVAL      bilateral    PAIN MANAGEMENT PROCEDURE Left 7/14/2020    LUMBAR FACET-L4-5, L5-SI performed by Katheryn Rocha MD at [address] [place] [address] Left 8/11/2020    LUMBAR MB / L5 DR B- L3,L4,L5 performed by Katheryn Rocha MD at [address] [place] [address] Left 9/8/2020    NERVE RADIOFREQUENCY ABLATION- L3,L4,L5 performed by Katheryn Rocha MD at [address] [place] [address] Left 12/8/2020    LUMBAR TRANSFORAMINAL L4-L5, performed by Katheryn Rocha MD at [address] [place] [address] Left 2/23/2021    LUMBAR TRANSFORAMINAL, L4-L5, L5-S1 performed by Katheryn Rocha MD at [address] [place] [address] N/A 4/13/2021    EPIDURAL STEROID INJECTION-LUMBAR L4-L5  LEFT OF MIDLINE performed by Katheryn Rocha MD at [address]         Family History   Problem Relation Age of Onset    Stroke Mother         59years old    Stroke Father         mid 76s    Other Other         Mat. and pat. aunt breast cancer. N family h/o DVT. Chief Complaint   Patient presents with    Gynecologic Exam     Yearly exam. Last pap 09/09/2021 negative, last HPV 2014 not detected. PE:  Vital Signs  Blood pressure 132/80, height 5' 8\" (1.727 m), weight 242 lb (109.8 kg), not currently breastfeeding. Estimated body mass index is 36.8 kg/m² as calculated from the following:    Height as of this encounter: 5' 8\" (1.727 m). Weight as of this encounter: 242 lb (109.8 kg). Labs:    No results found for this visit on 09/14/22. No data recorded    NURSE: Shay MARIA    HPI: here for annual gyn exam and continuation of ERT    Review of Systems   Constitutional: Negative. HENT:  Negative for congestion. Respiratory:  Negative for shortness of breath. Cardiovascular:  Negative for chest pain. Gastrointestinal:  Negative for abdominal pain. Genitourinary:  Negative for dysuria. Musculoskeletal:  Negative for back pain. Skin:  Negative for rash. Neurological:  Negative for headaches.    Psychiatric/Behavioral:  The patient is not nervous/anxious. Objective  Lymphatic:   no lymphadenopathy  Heent:   negative   Cor: regular rate and rhythm, no murmurs              Pul:clear to auscultation bilaterally- no wheezes, rales or rhonchi, normal air movement, no respiratory distress      GI: Abdomen soft, non-tender. BS normal. No masses,  No organomegaly           Extremities: normal strength, tone, and muscle mass   Breasts: Breast:normal appearance, no masses or tenderness   Pelvic Exam: GENITAL/URINARY:  External Genitalia:  General appearance; normal, Hair distribution; normal, Lesions absent  Urethral Meatus:  Size normal, Location normal, Lesions absent, Prolapse absent  Urethra: Fullness absent, Masses absent  Bladder:  Fullness absent, Masses absent, Tenderness absent, Cystocele absent  Vagina:  General appearance normal, Estrogen effect normal, Discharge absent, Lesions absent, Pelvic support normal  Adenexa: Masses absent, Tenderness absent  Anus/Perineum:  Lesions absent and Masses absent                                                  Assessment and Plan          Diagnosis Orders   1. Encounter for annual routine gynecological examination        2. Menopause  estradiol (ESTRACE) 1 MG tablet      3. Screening for osteoporosis  DEXA BONE DENSITY 2 SITES      4. Screening mammogram, encounter for  [facility] ESTIVEN DIGITAL SCREEN BILATERAL                I have changed Alla Mace's estradiol. I am also having her maintain her buPROPion, multivitamin, omeprazole, traZODone, montelukast, Albuterol Sulfate (PROAIR HFA IN), Trintellix, ALPRAZolam, fluticasone, gabapentin, and aspirin. Return in about 1 year (around 9/14/2023) for yearly. She was also counseled on her preventative health maintenance recommendations and follow-up. There are no Patient Instructions on file for this visit.     ELDER King CNM,9/14/2022 9:22 AM

## 2023-02-22 ENCOUNTER — HOSPITAL ENCOUNTER (OUTPATIENT)
Dept: WOMENS IMAGING | Age: 58
Discharge: HOME | End: 2023-02-24
Payer: OTHER GOVERNMENT

## 2023-02-22 DIAGNOSIS — Z12.31 SCREENING MAMMOGRAM, ENCOUNTER FOR: ICD-10-CM

## 2023-02-22 DIAGNOSIS — Z13.820 SCREENING FOR OSTEOPOROSIS: ICD-10-CM

## 2023-02-22 PROCEDURE — 77080 DXA BONE DENSITY AXIAL: CPT

## 2023-02-22 PROCEDURE — 77067 SCR MAMMO BI INCL CAD: CPT

## 2023-03-31 ENCOUNTER — HOSPITAL ENCOUNTER (OUTPATIENT)
Age: 58
Setting detail: SPECIMEN
Discharge: HOME | End: 2023-03-31
Payer: OTHER GOVERNMENT

## 2023-03-31 LAB
C DIFF GDH + TOXINS A+B STL QL IA.RAPID: NEGATIVE
SPECIMEN DESCRIPTION: NORMAL

## 2023-03-31 PROCEDURE — 87324 CLOSTRIDIUM AG IA: CPT

## 2023-03-31 PROCEDURE — 87506 IADNA-DNA/RNA PROBE TQ 6-11: CPT

## 2023-03-31 PROCEDURE — 87449 NOS EACH ORGANISM AG IA: CPT

## 2023-04-02 LAB
CAMPYLOBACTER DNA SPEC NAA+PROBE: NORMAL
ETEC ELTA+ESTB GENES STL QL NAA+PROBE: NORMAL
P SHIGELLOIDES DNA STL QL NAA+PROBE: NORMAL
SALMONELLA DNA SPEC QL NAA+PROBE: NORMAL
SHIGA TOXIN STX GENE SPEC NAA+PROBE: NORMAL
SHIGELLA DNA SPEC QL NAA+PROBE: NORMAL
SPECIMEN DESCRIPTION: NORMAL
V CHOL+PARA RFBL+TRKH+TNAA STL QL NAA+PR: NORMAL
Y ENTERO RECN STL QL NAA+PROBE: NORMAL

## 2023-05-11 ENCOUNTER — ANESTHESIA EVENT (OUTPATIENT)
Dept: OPERATING ROOM | Age: 58
End: 2023-05-11
Payer: OTHER GOVERNMENT

## 2023-05-12 ENCOUNTER — ANESTHESIA (OUTPATIENT)
Dept: OPERATING ROOM | Age: 58
End: 2023-05-12
Payer: OTHER GOVERNMENT

## 2023-05-12 ENCOUNTER — HOSPITAL ENCOUNTER (OUTPATIENT)
Age: 58
Setting detail: OUTPATIENT SURGERY
Discharge: HOME | End: 2023-05-12
Attending: SURGERY | Admitting: SURGERY
Payer: OTHER GOVERNMENT

## 2023-05-12 VITALS
OXYGEN SATURATION: 96 % | BODY MASS INDEX: 34.28 KG/M2 | WEIGHT: 226.2 LBS | HEART RATE: 79 BPM | DIASTOLIC BLOOD PRESSURE: 89 MMHG | TEMPERATURE: 97.8 F | SYSTOLIC BLOOD PRESSURE: 116 MMHG | HEIGHT: 68 IN | RESPIRATION RATE: 16 BRPM

## 2023-05-12 DIAGNOSIS — Z12.11 COLON CANCER SCREENING: ICD-10-CM

## 2023-05-12 PROCEDURE — 7100000010 HC PHASE II RECOVERY - FIRST 15 MIN: Performed by: SURGERY

## 2023-05-12 PROCEDURE — 2709999900 HC NON-CHARGEABLE SUPPLY: Performed by: SURGERY

## 2023-05-12 PROCEDURE — 2580000003 HC RX 258: Performed by: NURSE ANESTHETIST, CERTIFIED REGISTERED

## 2023-05-12 PROCEDURE — 3609010600 HC COLONOSCOPY POLYPECTOMY SNARE/COLD BIOPSY: Performed by: SURGERY

## 2023-05-12 PROCEDURE — 7100000011 HC PHASE II RECOVERY - ADDTL 15 MIN: Performed by: SURGERY

## 2023-05-12 PROCEDURE — 3700000000 HC ANESTHESIA ATTENDED CARE: Performed by: SURGERY

## 2023-05-12 PROCEDURE — 88305 TISSUE EXAM BY PATHOLOGIST: CPT

## 2023-05-12 PROCEDURE — 45380 COLONOSCOPY AND BIOPSY: CPT | Performed by: SURGERY

## 2023-05-12 PROCEDURE — 2500000003 HC RX 250 WO HCPCS: Performed by: NURSE ANESTHETIST, CERTIFIED REGISTERED

## 2023-05-12 PROCEDURE — 6360000002 HC RX W HCPCS: Performed by: NURSE ANESTHETIST, CERTIFIED REGISTERED

## 2023-05-12 PROCEDURE — 3700000001 HC ADD 15 MINUTES (ANESTHESIA): Performed by: SURGERY

## 2023-05-12 RX ORDER — PROPOFOL 10 MG/ML
INJECTION, EMULSION INTRAVENOUS CONTINUOUS PRN
Status: DISCONTINUED | OUTPATIENT
Start: 2023-05-12 | End: 2023-05-12 | Stop reason: SDUPTHER

## 2023-05-12 RX ORDER — SODIUM CHLORIDE, SODIUM LACTATE, POTASSIUM CHLORIDE, CALCIUM CHLORIDE 600; 310; 30; 20 MG/100ML; MG/100ML; MG/100ML; MG/100ML
INJECTION, SOLUTION INTRAVENOUS CONTINUOUS
Status: DISCONTINUED | OUTPATIENT
Start: 2023-05-12 | End: 2023-05-12 | Stop reason: HOSPADM

## 2023-05-12 RX ORDER — LIDOCAINE HYDROCHLORIDE 20 MG/ML
INJECTION, SOLUTION EPIDURAL; INFILTRATION; INTRACAUDAL; PERINEURAL PRN
Status: DISCONTINUED | OUTPATIENT
Start: 2023-05-12 | End: 2023-05-12 | Stop reason: SDUPTHER

## 2023-05-12 RX ORDER — SODIUM CHLORIDE, SODIUM LACTATE, POTASSIUM CHLORIDE, CALCIUM CHLORIDE 600; 310; 30; 20 MG/100ML; MG/100ML; MG/100ML; MG/100ML
INJECTION, SOLUTION INTRAVENOUS CONTINUOUS PRN
Status: DISCONTINUED | OUTPATIENT
Start: 2023-05-12 | End: 2023-05-12 | Stop reason: SDUPTHER

## 2023-05-12 RX ADMIN — SODIUM CHLORIDE, POTASSIUM CHLORIDE, SODIUM LACTATE AND CALCIUM CHLORIDE: 600; 310; 30; 20 INJECTION, SOLUTION INTRAVENOUS at 15:42

## 2023-05-12 RX ADMIN — SODIUM CHLORIDE, POTASSIUM CHLORIDE, SODIUM LACTATE AND CALCIUM CHLORIDE: 600; 310; 30; 20 INJECTION, SOLUTION INTRAVENOUS at 14:36

## 2023-05-12 RX ADMIN — PROPOFOL 30 MG: 10 INJECTION, EMULSION INTRAVENOUS at 15:53

## 2023-05-12 RX ADMIN — LIDOCAINE HYDROCHLORIDE 5 ML: 20 INJECTION, SOLUTION EPIDURAL; INFILTRATION; INTRACAUDAL; PERINEURAL at 15:44

## 2023-05-12 RX ADMIN — PROPOFOL 150 MCG/KG/MIN: 10 INJECTION, EMULSION INTRAVENOUS at 15:44

## 2023-05-12 RX ADMIN — PROPOFOL 30 MG: 10 INJECTION, EMULSION INTRAVENOUS at 15:58

## 2023-05-12 RX ADMIN — PROPOFOL 20 MG: 10 INJECTION, EMULSION INTRAVENOUS at 15:55

## 2023-05-12 ASSESSMENT — PAIN - FUNCTIONAL ASSESSMENT: PAIN_FUNCTIONAL_ASSESSMENT: NONE - DENIES PAIN

## 2023-05-12 NOTE — ANESTHESIA POSTPROCEDURE EVALUATION
Department of Anesthesiology  Postprocedure Note    Patient: Hipolito Cornelius  MRN: [id number]  YOB: 1965  Date of evaluation: 5/12/2023      Procedure Summary     Date: 05/12/23 Room / Location: [address] [place] [address]    Anesthesia Start: 9695 Anesthesia Stop: 1610    Procedure: COLONOSCOPY POLYPECTOMY SNARE/COLD BIOPSY Diagnosis:       Colon cancer screening      (SCREENING)    Surgeons: Mylene Frazier DO Responsible Provider: ELDER Jimenez CRNA    Anesthesia Type: general, TIVA ASA Status: 3          Anesthesia Type: No value filed.     Chandan Phase I: Chandan Score: 10    Chandan Phase II: Chandan Score: 10      Anesthesia Post Evaluation    Patient location during evaluation: PACU  Patient participation: complete - patient participated  Level of consciousness: awake and alert  Airway patency: patent  Nausea & Vomiting: no nausea and no vomiting  Complications: no  Cardiovascular status: hemodynamically stable  Respiratory status: acceptable, spontaneous ventilation and room air  Hydration status: euvolemic  Multimodal analgesia pain management approach

## 2023-05-12 NOTE — BRIEF OP NOTE
Brief Postoperative Note      Patient: Josey Membreno  YOB: 1965  MRN: [id number]  Talib Cao DO      Date of Procedure: 5/12/2023    Pre-Op Diagnosis Codes:     * Colon cancer screening [Z12.11]    Post-Op Diagnosis: same       Procedure:    Colonoscopy to cecum with random colon biopsies    Surgeon(s):  Lan Abernathy DO    Assistant:  * No surgical staff found *    Anesthesia: Monitor Anesthesia Care    Estimated Blood Loss (mL): none    Complications: None    Specimens:   ID Type Source Tests Collected by Time Destination   A :  Tissue Colon SURGICAL PATHOLOGY Lan Abernathy DO 5/12/2023 1602        Electronically signed by Lan Abernathy DO, FACOS, FACS on 5/13/2023 at 7:10 PM

## 2023-05-12 NOTE — OP NOTE
Operative Note      Patient: Ángel Jiménez  YOB: 1965  MRN: [id number]  Antoinette Palacios DO      Date of Procedure: 5/12/2023    Pre-Op Diagnosis Codes:     * Colon cancer screening [Z12.11]    Post-Op Diagnosis: same       Procedure:    Colonoscopy to cecum with random colon biopsies    Surgeon(s):  Nava Lyon DO    Assistant:  * No surgical staff found *    Anesthesia: Monitor Anesthesia Care    Estimated Blood Loss (mL): none    Complications: None    Specimens:   ID Type Source Tests Collected by Time Destination   A :  Tissue Colon SURGICAL PATHOLOGY Nava Lyon DO 5/12/2023 1602      Procedure details:    I do meet with the patient in preoperative holding area. Please see H&P for indications for the above named procedure. Patient was brought to the endoscopy suite and placed under monitored anesthesia. Patient was positioned in left lateral position. Time out called and procedure confirmed. Rectal examination performed. The lubricated variable stiffness pediatric colonoscope was carefully passed under direct vision into the rectum, advanced through the sigmoid colon, transverse colon, ascending colon and to the cecum. Findings:     Cecum: normal cecal pouch. IC valve and appendiceal orifice well confirmed   Ascending: normal  Transverse: normal  Descending: normal  Sigmoid: normal  Rectum: normal  Rectal exam: negative  Bowel prep quality: excellent       She has had some loose stools for 7 weeks, negative stool cultures, no bleeding, random biopsies taken to check for microscopic colitis vs other. However, this is still considered a screening colonoscopy. At the distal 1/3 of the rectum, the scope was retroflexed and was negative. The patient tolerated the procedure well. The abdomen was soft after the procedure. I spoke with pt/family afterwards. Next colonoscopy for screening:  10 years.      Electronically signed by Nava Lyon DO, FACOS, FACS on 5/13/2023 at

## 2023-05-12 NOTE — DISCHARGE INSTRUCTIONS
DISCHARGE INSTRUCTIONS for COLONOSCOPY    1. Do not drive or operate machinery for 24 hours. 2.  Do not make important personal or business decisions for 24 hours. 3.  Do not drink alcoholic beverages for 24 hours. 4.  Do not smoke tobacco products for 24 hours. COLONOSCOPY DISCHARGE INSTRUCTIONS:    It's normal to have a feeling of fullness or mild cramping in your abdomen afterwards due to air that is put into your bowel during the procedure. Mild activities such as walking will help you pass the air. You may resume your regular diet. CALL THE DOCTOR IF YOU HAVE:     Chest pain or trouble breathing. Persistent nd significant bleeding from your rectum such as soaking through clothing or feeling very dizzy or sweating    A fever above 101F or if you have chills    If symptoms are to severe call 911 or go to the nearest Emergency Room.

## 2023-05-12 NOTE — H&P
GENERAL SURGERY CONSULTATION      Patient's Name/ Date of Birth/ Gender: Socorro Mcfarland / 1965 (63 y.o.) / female     PCP: Rosas Dumas DO  Referring:     History of present Illness:  Patient is a pleasant 63 y.o. female  kindly referred by Rosas Dumas DO    Past Medical History:  has a past medical history of Depression, GERD (gastroesophageal reflux disease), H/O cardiovascular stress test, and H/O cardiovascular stress test.    Past Surgical History:   Past Surgical History:   Procedure Laterality Date    CHOLECYSTECTOMY      DILATION AND CURETTAGE OF UTERUS      FOOT SURGERY      bilateral    HYSTERECTOMY (CERVIX STATUS UNKNOWN)      KNEE ARTHROTOMY      OVARIAN CYST REMOVAL      bilateral    OVARY REMOVAL      bilateral    PAIN MANAGEMENT PROCEDURE Left 7/14/2020    LUMBAR FACET-L4-5, L5-SI performed by Guillermo Choudhury MD at [address] [place] [address] Left 8/11/2020    LUMBAR MB / L5 DR B- L3,L4,L5 performed by Guillermo Choudhury MD at [address] [place] [address] Left 9/8/2020    NERVE RADIOFREQUENCY ABLATION- L3,L4,L5 performed by Guillermo Choudhury MD at [address] [place] [address] Left 12/8/2020    LUMBAR TRANSFORAMINAL L4-L5, performed by Guillermo Choudhury MD at [address] [place] [address] Left 2/23/2021    LUMBAR TRANSFORAMINAL, L4-L5, L5-S1 performed by Guillermo Choudhury MD at [address] [place] [address] N/A 4/13/2021    EPIDURAL STEROID INJECTION-LUMBAR L4-L5  LEFT OF MIDLINE performed by Guillermo Choudhury MD at [address] [place] [address] History:  reports that she quit smoking about 10 years ago. She has never used smokeless tobacco. She reports current alcohol use. She reports that she does not use drugs. Family History: family history includes Other in an other family member; Stroke in her father and mother.     Review of Systems:   General: Completed and, except as mentioned above, was negative or noncontributory  Psychological:  Completed and, except

## 2023-05-12 NOTE — ANESTHESIA PRE PROCEDURE
Santiago Miller MD at [facility] N/A 4/13/2021    EPIDURAL STEROID INJECTION-LUMBAR L4-L5  LEFT OF MIDLINE performed by Santiago Miller MD at [address] [place] [address] History:    Social History     Tobacco Use    Smoking status: Former     Types: Cigarettes     Quit date: 7/2/2012     Years since quitting: 10.8    Smokeless tobacco: Never   Substance Use Topics    Alcohol use: Yes     Comment: rare                                Counseling given: Not Answered      Vital Signs (Current):   Vitals:    04/28/23 1343 05/12/23 1420 05/12/23 1431   BP:   (!) 126/96   Pulse:   96   Resp:   18   Temp:   36.4 °C (97.6 °F)   TempSrc:   Temporal   SpO2:   95%   Weight: 235 lb (106.6 kg) 226 lb 3.2 oz (102.6 kg)    Height: 5' 8\" (1.727 m) 5' 8\" (1.727 m)                                               BP Readings from Last 3 Encounters:   05/12/23 (!) 126/96   09/14/22 132/80   09/09/21 122/74       NPO Status: Time of last liquid consumption: 0830                        Time of last solid consumption: 1800                        Date of last liquid consumption: 05/12/23                        Date of last solid food consumption: 05/10/23    BMI:   Wt Readings from Last 3 Encounters:   05/12/23 226 lb 3.2 oz (102.6 kg)   09/14/22 242 lb (109.8 kg)   09/09/21 225 lb (102.1 kg)     Body mass index is 34.39 kg/m².     CBC:   Lab Results   Component Value Date/Time    WBC 6.9 02/09/2022 10:37 AM    RBC 4.52 02/09/2022 10:37 AM    HGB 12.9 02/09/2022 10:37 AM    HCT 39.5 02/09/2022 10:37 AM    MCV 87.4 02/09/2022 10:37 AM    RDW 12.0 02/09/2022 10:37 AM     02/09/2022 10:37 AM       CMP:   Lab Results   Component Value Date/Time     02/09/2022 10:37 AM    K 4.5 02/09/2022 10:37 AM     02/09/2022 10:37 AM    CO2 26 02/09/2022 10:37 AM    BUN 14 02/09/2022 10:37 AM    CREATININE 0.77 02/09/2022 10:37 AM    GFRAA >60 02/09/2022 10:37 AM    LABGLOM >60 02/09/2022 10:37 AM

## 2023-05-12 NOTE — PROGRESS NOTES
Patient states readiness to go home; discharge instructions given to patient and patient , both verbalize understanding and offer no questions at this time. Discharge Criteria    Inpatients must meet Criteria 1 through 7. All other patients are either YES or N/A. If a NO is chosen then Anesthesia or Surgeon must be notified. 1.  Minimum 30 minutes after last dose of sedative medication. Yes      2. Systolic BP between 90 - 459. Diastolic BP between 60 - 90. Yes      3. Pulse between 60 - 120    Yes      4. Respirations between 8 - 25. Yes      5. SpO2 92% - 100%. Yes      6. Able to cough and swallow or return to baseline function. Yes      7. Alert and oriented or return to baseline mental status. Yes      8. Demonstrates controlled, coordinated movements, ambulates with steady gait, or return to baseline activity function. Yes      9. Minimal or no pain or nausea, or at a level tolerable and acceptable to patient. Yes      10. Takes and retains oral fluids as allowed. Yes      11. Procedural / perioperative site stable. Minimal or no bleeding. Yes          12. If GI endoscopy procedure, minimal or no abdominal distention or passing flatus. Yes      13. Written discharge instructions and emergency telephone number provided. Yes      14. Accompanied by a responsible adult.     Yes

## 2023-05-16 LAB — SURGICAL PATHOLOGY REPORT: NORMAL

## 2023-09-20 ENCOUNTER — OFFICE VISIT (OUTPATIENT)
Dept: OBGYN | Age: 58
End: 2023-09-20
Payer: OTHER GOVERNMENT

## 2023-09-20 VITALS
SYSTOLIC BLOOD PRESSURE: 124 MMHG | HEIGHT: 68 IN | DIASTOLIC BLOOD PRESSURE: 66 MMHG | WEIGHT: 231 LBS | BODY MASS INDEX: 35.01 KG/M2

## 2023-09-20 DIAGNOSIS — Z78.0 MENOPAUSE: ICD-10-CM

## 2023-09-20 DIAGNOSIS — Z12.31 SCREENING MAMMOGRAM, ENCOUNTER FOR: ICD-10-CM

## 2023-09-20 DIAGNOSIS — Z01.419 ENCOUNTER FOR ANNUAL ROUTINE GYNECOLOGICAL EXAMINATION: Primary | ICD-10-CM

## 2023-09-20 PROCEDURE — 99396 PREV VISIT EST AGE 40-64: CPT | Performed by: ADVANCED PRACTICE MIDWIFE

## 2023-09-20 RX ORDER — ARIPIPRAZOLE 2 MG/1
TABLET ORAL
COMMUNITY
Start: 2023-07-17

## 2023-09-20 RX ORDER — ESTRADIOL 1 MG/1
1 TABLET ORAL DAILY
Qty: 90 TABLET | Refills: 3 | Status: SHIPPED | OUTPATIENT
Start: 2023-09-20

## 2023-09-20 NOTE — PROGRESS NOTES
YEARLY PHYSICAL    Date of service: 2023    Edison Hi  Is a 63 y.o.  , female    PT's PCP is: Sepideh Kathleen DO     : 1965                                             Subjective:       No LMP recorded. Patient has had a hysterectomy. Are your menses regular: not applicable    OB History    Para Term  AB Living   0 0 0 0 0 0   SAB IAB Ectopic Molar Multiple Live Births   0 0 0 0 0 0        Social History     Tobacco Use   Smoking Status Former    Types: Cigarettes    Quit date: 2012    Years since quittin.2   Smokeless Tobacco Never        Social History     Substance and Sexual Activity   Alcohol Use Yes    Comment: rare       Family History   Problem Relation Age of Onset    Stroke Mother         59years old    Stroke Father         mid 76s    Deep Vein Thrombosis Brother     Other Other         Mat. and pat. aunt breast cancer. N family h/o DVT. Breast Cancer Maternal Aunt     Breast Cancer Paternal Aunt        Any family history of breast or ovarian cancer: Yes  , aunt breast  Any family history of blood clots: Yes    Have you had a positive covid test: Yes    Have you had the covid immunization: Yes      Allergies: Ceftin [cefuroxime], Dilaudid [hydromorphone hcl], Percocet [oxycodone-acetaminophen], and Amoxicillin-pot clavulanate      Current Outpatient Medications:     ARIPiprazole (ABILIFY) 2 MG tablet, , Disp: , Rfl:     estradiol (ESTRACE) 1 MG tablet, Take 1 tablet by mouth daily, Disp: 90 tablet, Rfl: 3    aspirin 81 MG EC tablet, Take 1 tablet by mouth daily, Disp: , Rfl:     gabapentin (NEURONTIN) 400 MG capsule, Take 1 capsule by mouth in the morning and at bedtime. , Disp: , Rfl:     fluticasone (FLONASE) 50 MCG/ACT nasal spray, 1 spray by Nasal route as needed, Disp: , Rfl:     TRINTELLIX 20 MG TABS tablet, 1 tablet daily, Disp: , Rfl:     ALPRAZolam (XANAX) 0.25 MG tablet,

## 2023-09-22 ASSESSMENT — ENCOUNTER SYMPTOMS
ABDOMINAL PAIN: 0
BACK PAIN: 0
SHORTNESS OF BREATH: 0

## 2023-11-20 ENCOUNTER — HOSPITAL ENCOUNTER (OUTPATIENT)
Age: 58
Discharge: HOME | End: 2023-11-22
Payer: OTHER GOVERNMENT

## 2023-11-20 ENCOUNTER — HOSPITAL ENCOUNTER (OUTPATIENT)
Dept: GENERAL RADIOLOGY | Age: 58
Discharge: HOME | End: 2023-11-22
Payer: OTHER GOVERNMENT

## 2023-11-20 DIAGNOSIS — M79.672 FOOT PAIN, LEFT: ICD-10-CM

## 2023-11-20 PROCEDURE — 73630 X-RAY EXAM OF FOOT: CPT

## 2024-05-08 ENCOUNTER — HOSPITAL ENCOUNTER (OUTPATIENT)
Dept: WOMENS IMAGING | Age: 59
Discharge: HOME | End: 2024-05-10
Payer: OTHER GOVERNMENT

## 2024-05-08 VITALS — WEIGHT: 220 LBS | HEIGHT: 68 IN | BODY MASS INDEX: 33.34 KG/M2

## 2024-05-08 DIAGNOSIS — Z12.31 SCREENING MAMMOGRAM, ENCOUNTER FOR: ICD-10-CM

## 2024-05-08 PROCEDURE — 77063 BREAST TOMOSYNTHESIS BI: CPT

## 2024-06-27 ENCOUNTER — HOSPITAL ENCOUNTER (OUTPATIENT)
Dept: MRI IMAGING | Age: 59
Discharge: HOME | End: 2024-06-29
Payer: OTHER GOVERNMENT

## 2024-06-27 DIAGNOSIS — M51.9 LUMBAR DISC DISEASE: ICD-10-CM

## 2024-06-27 PROCEDURE — 72148 MRI LUMBAR SPINE W/O DYE: CPT

## 2024-07-03 ENCOUNTER — TRANSCRIBE ORDERS (OUTPATIENT)
Dept: ADMINISTRATIVE | Age: 59
End: 2024-07-03

## 2024-07-03 DIAGNOSIS — M48.04 THORACIC SPINAL STENOSIS: Primary | ICD-10-CM

## 2024-07-16 ENCOUNTER — HOSPITAL ENCOUNTER (OUTPATIENT)
Dept: MRI IMAGING | Age: 59
Discharge: HOME | End: 2024-07-18
Payer: OTHER GOVERNMENT

## 2024-07-16 DIAGNOSIS — M48.04 THORACIC SPINAL STENOSIS: ICD-10-CM

## 2024-07-16 PROCEDURE — 72157 MRI CHEST SPINE W/O & W/DYE: CPT

## 2024-07-16 PROCEDURE — A9579 GAD-BASE MR CONTRAST NOS,1ML: HCPCS | Performed by: NURSE PRACTITIONER

## 2024-07-16 PROCEDURE — 6360000004 HC RX CONTRAST MEDICATION: Performed by: NURSE PRACTITIONER

## 2024-07-16 RX ADMIN — GADOTERIDOL 20 ML: 279.3 INJECTION, SOLUTION INTRAVENOUS at 08:24

## 2024-07-31 DIAGNOSIS — Z78.0 MENOPAUSE: ICD-10-CM

## 2024-08-01 RX ORDER — ESTRADIOL 1 MG/1
1 TABLET ORAL DAILY
Qty: 90 TABLET | Refills: 0 | Status: SHIPPED | OUTPATIENT
Start: 2024-08-01

## 2024-09-24 ENCOUNTER — OFFICE VISIT (OUTPATIENT)
Dept: OBGYN | Age: 59
End: 2024-09-24
Payer: OTHER GOVERNMENT

## 2024-09-24 VITALS
DIASTOLIC BLOOD PRESSURE: 70 MMHG | SYSTOLIC BLOOD PRESSURE: 124 MMHG | HEIGHT: 68 IN | BODY MASS INDEX: 33.65 KG/M2 | WEIGHT: 222 LBS

## 2024-09-24 DIAGNOSIS — Z12.31 SCREENING MAMMOGRAM FOR BREAST CANCER: ICD-10-CM

## 2024-09-24 DIAGNOSIS — Z78.0 MENOPAUSE: ICD-10-CM

## 2024-09-24 DIAGNOSIS — Z01.419 ENCOUNTER FOR ANNUAL ROUTINE GYNECOLOGICAL EXAMINATION: Primary | ICD-10-CM

## 2024-09-24 PROCEDURE — 99396 PREV VISIT EST AGE 40-64: CPT | Performed by: ADVANCED PRACTICE MIDWIFE

## 2024-09-24 RX ORDER — ESTRADIOL 1 MG/1
1 TABLET ORAL DAILY
Qty: 90 TABLET | Refills: 4 | Status: SHIPPED | OUTPATIENT
Start: 2024-09-24

## 2024-09-24 RX ORDER — DICYCLOMINE HYDROCHLORIDE 10 MG/1
10 CAPSULE ORAL
COMMUNITY

## 2024-09-24 ASSESSMENT — ENCOUNTER SYMPTOMS
COUGH: 0
ABDOMINAL PAIN: 0
WHEEZING: 0
DIARRHEA: 1
SORE THROAT: 0
RHINORRHEA: 0
NAUSEA: 0
VOMITING: 0
CONSTIPATION: 1
SHORTNESS OF BREATH: 0

## 2024-09-24 ASSESSMENT — PATIENT HEALTH QUESTIONNAIRE - PHQ9
SUM OF ALL RESPONSES TO PHQ QUESTIONS 1-9: 0
2. FEELING DOWN, DEPRESSED OR HOPELESS: NOT AT ALL
1. LITTLE INTEREST OR PLEASURE IN DOING THINGS: NOT AT ALL
SUM OF ALL RESPONSES TO PHQ9 QUESTIONS 1 & 2: 0
SUM OF ALL RESPONSES TO PHQ QUESTIONS 1-9: 0

## 2025-01-08 ENCOUNTER — TELEPHONE (OUTPATIENT)
Dept: OBGYN | Age: 60
End: 2025-01-08

## 2025-01-08 NOTE — TELEPHONE ENCOUNTER
Patient calls with concerns  currently taking Estrace 1 mg QD and is asking about possible increase in dose, having a lot of concerns with hot flashes.

## 2025-01-09 NOTE — TELEPHONE ENCOUNTER
Spoke with patient, she states nothing has changed , lifestyle, medication , weight gain or loss, anything different you can do?

## 2025-06-16 ENCOUNTER — TRANSCRIBE ORDERS (OUTPATIENT)
Dept: ADMINISTRATIVE | Age: 60
End: 2025-06-16

## 2025-06-16 DIAGNOSIS — G95.0 SYRINX OF SPINAL CORD (HCC): Primary | ICD-10-CM

## 2025-07-16 ENCOUNTER — HOSPITAL ENCOUNTER (OUTPATIENT)
Dept: WOMENS IMAGING | Age: 60
Discharge: HOME | End: 2025-07-18
Attending: ADVANCED PRACTICE MIDWIFE
Payer: OTHER GOVERNMENT

## 2025-07-16 VITALS — WEIGHT: 230 LBS | HEIGHT: 68 IN | BODY MASS INDEX: 34.86 KG/M2

## 2025-07-16 DIAGNOSIS — Z12.31 SCREENING MAMMOGRAM FOR BREAST CANCER: ICD-10-CM

## 2025-07-16 PROCEDURE — 77063 BREAST TOMOSYNTHESIS BI: CPT

## 2025-08-05 ENCOUNTER — HOSPITAL ENCOUNTER (OUTPATIENT)
Dept: MRI IMAGING | Age: 60
Discharge: HOME | End: 2025-08-07
Payer: OTHER GOVERNMENT

## 2025-08-05 DIAGNOSIS — G95.0 SYRINX OF SPINAL CORD (HCC): ICD-10-CM

## 2025-08-05 PROCEDURE — 72146 MRI CHEST SPINE W/O DYE: CPT

## (undated) DEVICE — AVANOS* TUOHY EPIDURAL NEEDLE: Brand: AVANOS

## (undated) DEVICE — 9165 UNIVERSAL PATIENT PLATE: Brand: 3M™

## (undated) DEVICE — PAIN TRAY: Brand: MEDLINE INDUSTRIES, INC.

## (undated) DEVICE — SET EXTN TBNG MINIBOR 20IN

## (undated) DEVICE — SOLUTION IV IRRIG POUR BRL 0.9% SODIUM CHL 2F7124

## (undated) DEVICE — AVANOS* QUINCKE NEEDLE: Brand: AVANOS

## (undated) DEVICE — AVANOS* UNIVERSAL BLOCK TRAYS: Brand: AVANOS

## (undated) DEVICE — CURVED SHARP RF CANNULA, RADIOPAQUE MARKER: Brand: RADIOPAQUE RADIOFREQUENCY CANNULA

## (undated) DEVICE — CANNULA ORAL NSL AD CO2 N INTUB O2 DEL DISP TRU LNK

## (undated) DEVICE — SHEET,DRAPE,53X77,STERILE: Brand: MEDLINE

## (undated) DEVICE — FORCEPS BX JUMBO L240CM DIA2.8MM WRK CHN 3.2MM ORNG W/ NDL

## (undated) DEVICE — ZINACTIVE USE 2537982 PAD GRND FOR RF PAIN MGMT DISP

## (undated) DEVICE — TUBING SUCT NON-STRL 9/32X100 W/CNNT